# Patient Record
Sex: MALE | Race: WHITE | NOT HISPANIC OR LATINO | URBAN - METROPOLITAN AREA
[De-identification: names, ages, dates, MRNs, and addresses within clinical notes are randomized per-mention and may not be internally consistent; named-entity substitution may affect disease eponyms.]

---

## 2021-10-27 ENCOUNTER — INPATIENT (INPATIENT)
Facility: HOSPITAL | Age: 66
LOS: 4 days | Discharge: HOME HEALTH | End: 2021-11-01
Attending: SURGERY | Admitting: SURGERY
Payer: MEDICARE

## 2021-10-27 VITALS
RESPIRATION RATE: 20 BRPM | HEART RATE: 88 BPM | DIASTOLIC BLOOD PRESSURE: 79 MMHG | TEMPERATURE: 97 F | OXYGEN SATURATION: 99 % | SYSTOLIC BLOOD PRESSURE: 147 MMHG

## 2021-10-27 DIAGNOSIS — Z78.9 OTHER SPECIFIED HEALTH STATUS: Chronic | ICD-10-CM

## 2021-10-27 LAB
ANION GAP SERPL CALC-SCNC: 10 MMOL/L — SIGNIFICANT CHANGE UP (ref 7–14)
APTT BLD: 28.3 SEC — SIGNIFICANT CHANGE UP (ref 27–39.2)
BASOPHILS # BLD AUTO: 0.04 K/UL — SIGNIFICANT CHANGE UP (ref 0–0.2)
BASOPHILS NFR BLD AUTO: 0.4 % — SIGNIFICANT CHANGE UP (ref 0–1)
BLD GP AB SCN SERPL QL: SIGNIFICANT CHANGE UP
BUN SERPL-MCNC: 18 MG/DL — SIGNIFICANT CHANGE UP (ref 10–20)
CALCIUM SERPL-MCNC: 9.8 MG/DL — SIGNIFICANT CHANGE UP (ref 8.5–10.1)
CHLORIDE SERPL-SCNC: 103 MMOL/L — SIGNIFICANT CHANGE UP (ref 98–110)
CO2 SERPL-SCNC: 29 MMOL/L — SIGNIFICANT CHANGE UP (ref 17–32)
CREAT SERPL-MCNC: 1.2 MG/DL — SIGNIFICANT CHANGE UP (ref 0.7–1.5)
EOSINOPHIL # BLD AUTO: 0.04 K/UL — SIGNIFICANT CHANGE UP (ref 0–0.7)
EOSINOPHIL NFR BLD AUTO: 0.4 % — SIGNIFICANT CHANGE UP (ref 0–8)
GLUCOSE SERPL-MCNC: 127 MG/DL — HIGH (ref 70–99)
HCT VFR BLD CALC: 45.9 % — SIGNIFICANT CHANGE UP (ref 42–52)
HGB BLD-MCNC: 15 G/DL — SIGNIFICANT CHANGE UP (ref 14–18)
IMM GRANULOCYTES NFR BLD AUTO: 0.2 % — SIGNIFICANT CHANGE UP (ref 0.1–0.3)
INR BLD: 1.03 RATIO — SIGNIFICANT CHANGE UP (ref 0.65–1.3)
LYMPHOCYTES # BLD AUTO: 1.24 K/UL — SIGNIFICANT CHANGE UP (ref 1.2–3.4)
LYMPHOCYTES # BLD AUTO: 13.8 % — LOW (ref 20.5–51.1)
MCHC RBC-ENTMCNC: 31.3 PG — HIGH (ref 27–31)
MCHC RBC-ENTMCNC: 32.7 G/DL — SIGNIFICANT CHANGE UP (ref 32–37)
MCV RBC AUTO: 95.8 FL — HIGH (ref 80–94)
MONOCYTES # BLD AUTO: 0.55 K/UL — SIGNIFICANT CHANGE UP (ref 0.1–0.6)
MONOCYTES NFR BLD AUTO: 6.1 % — SIGNIFICANT CHANGE UP (ref 1.7–9.3)
NEUTROPHILS # BLD AUTO: 7.08 K/UL — HIGH (ref 1.4–6.5)
NEUTROPHILS NFR BLD AUTO: 79.1 % — HIGH (ref 42.2–75.2)
NRBC # BLD: 0 /100 WBCS — SIGNIFICANT CHANGE UP (ref 0–0)
PLATELET # BLD AUTO: 302 K/UL — SIGNIFICANT CHANGE UP (ref 130–400)
POTASSIUM SERPL-MCNC: 4.6 MMOL/L — SIGNIFICANT CHANGE UP (ref 3.5–5)
POTASSIUM SERPL-SCNC: 4.6 MMOL/L — SIGNIFICANT CHANGE UP (ref 3.5–5)
PROTHROM AB SERPL-ACNC: 11.8 SEC — SIGNIFICANT CHANGE UP (ref 9.95–12.87)
RBC # BLD: 4.79 M/UL — SIGNIFICANT CHANGE UP (ref 4.7–6.1)
RBC # FLD: 14.2 % — SIGNIFICANT CHANGE UP (ref 11.5–14.5)
SARS-COV-2 RNA SPEC QL NAA+PROBE: SIGNIFICANT CHANGE UP
SODIUM SERPL-SCNC: 142 MMOL/L — SIGNIFICANT CHANGE UP (ref 135–146)
WBC # BLD: 8.97 K/UL — SIGNIFICANT CHANGE UP (ref 4.8–10.8)
WBC # FLD AUTO: 8.97 K/UL — SIGNIFICANT CHANGE UP (ref 4.8–10.8)

## 2021-10-27 PROCEDURE — 73552 X-RAY EXAM OF FEMUR 2/>: CPT | Mod: 26,LT

## 2021-10-27 PROCEDURE — 93010 ELECTROCARDIOGRAM REPORT: CPT

## 2021-10-27 PROCEDURE — 99222 1ST HOSP IP/OBS MODERATE 55: CPT

## 2021-10-27 PROCEDURE — 99285 EMERGENCY DEPT VISIT HI MDM: CPT

## 2021-10-27 RX ORDER — TETANUS TOXOID, REDUCED DIPHTHERIA TOXOID AND ACELLULAR PERTUSSIS VACCINE, ADSORBED 5; 2.5; 8; 8; 2.5 [IU]/.5ML; [IU]/.5ML; UG/.5ML; UG/.5ML; UG/.5ML
0.5 SUSPENSION INTRAMUSCULAR ONCE
Refills: 0 | Status: COMPLETED | OUTPATIENT
Start: 2021-10-27 | End: 2021-10-27

## 2021-10-27 RX ORDER — PANTOPRAZOLE SODIUM 20 MG/1
40 TABLET, DELAYED RELEASE ORAL
Refills: 0 | Status: DISCONTINUED | OUTPATIENT
Start: 2021-10-27 | End: 2021-11-01

## 2021-10-27 RX ORDER — ATORVASTATIN CALCIUM 80 MG/1
40 TABLET, FILM COATED ORAL AT BEDTIME
Refills: 0 | Status: DISCONTINUED | OUTPATIENT
Start: 2021-10-27 | End: 2021-11-01

## 2021-10-27 RX ORDER — LISINOPRIL 2.5 MG/1
10 TABLET ORAL DAILY
Refills: 0 | Status: DISCONTINUED | OUTPATIENT
Start: 2021-10-27 | End: 2021-11-01

## 2021-10-27 RX ORDER — HYDROMORPHONE HYDROCHLORIDE 2 MG/ML
0.5 INJECTION INTRAMUSCULAR; INTRAVENOUS; SUBCUTANEOUS EVERY 4 HOURS
Refills: 0 | Status: DISCONTINUED | OUTPATIENT
Start: 2021-10-27 | End: 2021-10-28

## 2021-10-27 RX ORDER — ALBUTEROL 90 UG/1
2 AEROSOL, METERED ORAL EVERY 6 HOURS
Refills: 0 | Status: DISCONTINUED | OUTPATIENT
Start: 2021-10-27 | End: 2021-11-01

## 2021-10-27 RX ORDER — ASPIRIN/CALCIUM CARB/MAGNESIUM 324 MG
81 TABLET ORAL DAILY
Refills: 0 | Status: DISCONTINUED | OUTPATIENT
Start: 2021-10-29 | End: 2021-11-01

## 2021-10-27 RX ORDER — ONDANSETRON 8 MG/1
4 TABLET, FILM COATED ORAL ONCE
Refills: 0 | Status: COMPLETED | OUTPATIENT
Start: 2021-10-27 | End: 2021-10-27

## 2021-10-27 RX ORDER — CEFAZOLIN SODIUM 1 G
1000 VIAL (EA) INJECTION ONCE
Refills: 0 | Status: COMPLETED | OUTPATIENT
Start: 2021-10-27 | End: 2021-10-27

## 2021-10-27 RX ORDER — CEFAZOLIN SODIUM 1 G
1000 VIAL (EA) INJECTION EVERY 8 HOURS
Refills: 0 | Status: DISCONTINUED | OUTPATIENT
Start: 2021-10-27 | End: 2021-11-01

## 2021-10-27 RX ORDER — HYDROMORPHONE HYDROCHLORIDE 2 MG/ML
0.5 INJECTION INTRAMUSCULAR; INTRAVENOUS; SUBCUTANEOUS ONCE
Refills: 0 | Status: DISCONTINUED | OUTPATIENT
Start: 2021-10-27 | End: 2021-10-27

## 2021-10-27 RX ADMIN — ATORVASTATIN CALCIUM 40 MILLIGRAM(S): 80 TABLET, FILM COATED ORAL at 21:58

## 2021-10-27 RX ADMIN — ONDANSETRON 4 MILLIGRAM(S): 8 TABLET, FILM COATED ORAL at 23:17

## 2021-10-27 RX ADMIN — HYDROMORPHONE HYDROCHLORIDE 0.5 MILLIGRAM(S): 2 INJECTION INTRAMUSCULAR; INTRAVENOUS; SUBCUTANEOUS at 21:58

## 2021-10-27 RX ADMIN — HYDROMORPHONE HYDROCHLORIDE 0.5 MILLIGRAM(S): 2 INJECTION INTRAMUSCULAR; INTRAVENOUS; SUBCUTANEOUS at 17:07

## 2021-10-27 RX ADMIN — HYDROMORPHONE HYDROCHLORIDE 0.5 MILLIGRAM(S): 2 INJECTION INTRAMUSCULAR; INTRAVENOUS; SUBCUTANEOUS at 22:30

## 2021-10-27 RX ADMIN — TETANUS TOXOID, REDUCED DIPHTHERIA TOXOID AND ACELLULAR PERTUSSIS VACCINE, ADSORBED 0.5 MILLILITER(S): 5; 2.5; 8; 8; 2.5 SUSPENSION INTRAMUSCULAR at 15:28

## 2021-10-27 RX ADMIN — Medication 100 MILLIGRAM(S): at 15:28

## 2021-10-27 RX ADMIN — Medication 100 MILLIGRAM(S): at 22:22

## 2021-10-27 RX ADMIN — HYDROMORPHONE HYDROCHLORIDE 0.5 MILLIGRAM(S): 2 INJECTION INTRAMUSCULAR; INTRAVENOUS; SUBCUTANEOUS at 17:06

## 2021-10-27 NOTE — PATIENT PROFILE ADULT - NSPROPTRIGHTBILLOFRIGHTS_GEN_A_NUR
Pt called ? If we received doppler results done in [place] last week  I do see copy scanned into chart, will req TATYANA Murray to review and advise, pt would like return call re: results  patient

## 2021-10-27 NOTE — ED PROVIDER NOTE - PHYSICAL EXAMINATION
VITAL SIGNS: I have reviewed nursing notes and confirm.  CONSTITUTIONAL: Well-developed; well-nourished; in no acute distress.   SKIN: large/deep 4 inch lac to left thigh down to muscle  HEAD: Normocephalic; atraumatic.  EYES:  conjunctiva and sclera clear.  ENT: No nasal discharge; airway clear.  CARD: S1, S2 normal; no murmurs, gallops, or rubs. Regular rate and rhythm.   RESP: No wheezes, rales or rhonchi.  ABD: Normal bowel sounds; soft; non-distended; non-tender  EXT: Normal ROM.  No clubbing, cyanosis or edema.   LYMPH: No acute cervical adenopathy.  NEURO: Alert, oriented, grossly unremarkable

## 2021-10-27 NOTE — H&P ADULT - NSHPLABSRESULTS_GEN_ALL_CORE
15.0   8.97  )-----------( 302      ( 27 Oct 2021 15:58 )             45.9     10-27    142  |  103  |  18  ----------------------------<  127<H>  4.6   |  29  |  1.2    Ca    9.8      27 Oct 2021 15:58              PT/INR - ( 27 Oct 2021 15:58 )   PT: 11.80 sec;   INR: 1.03 ratio         PTT - ( 27 Oct 2021 15:58 )  PTT:28.3 sec  Lactate Trend        CAPILLARY BLOOD GLUCOSE

## 2021-10-27 NOTE — ED ADULT NURSE NOTE - TEMPLATE LIST FOR HEAD TO TOE ASSESSMENT
Spoke to patient and she stated that she will make an appointment online for the CPE which is due this month. Rx filled for one month per Dr. Edouard, note made to pharmacy for the reason of refill.   Wounds

## 2021-10-27 NOTE — H&P ADULT - HISTORY OF PRESENT ILLNESS
67yo male (seems forgetful, obtained several details including medication list and PMH from surgical team) whose PMH includes CVA, HTN and HLD, presents to the ER with left thigh laceration following an accident with a . Denies fevers, chills and currently no pain Dilaudid was ordered for him in the ER). In the ER he did receive IV antibiotics and general surgery preformed a skin closure procedure. Because of muscle (and likely nerve) involvement, orthopedist surgery was called

## 2021-10-27 NOTE — ED PROVIDER NOTE - ATTENDING CONTRIBUTION TO CARE
65 yo M pmh of CVA, HTN, HLD presents with laceration to the left upper thigh. Was doing work at a friends house when the  got caught and went into his thigh. Large laceration to the upper thigh. Bleeding stopped prior to arrival. Unknown when last tdap was. Currently on plavix. Walking with a limp due to pain.     CONSTITUTIONAL: Well-developed; well-nourished; in no acute distress.   SKIN: warm, dry. 12 cm laceration to the left upper thigh, + muscle involvement. + debris in the wound.   HEAD: Normocephalic; atraumatic.  EXT: Normal ROM.  5/5 strength in all 4 extremities   LYMPH: No acute cervical adenopathy.  NEURO: decreased sensation to the medial and just inferior to the wound. motor strength intact.

## 2021-10-27 NOTE — PROCEDURE NOTE - ADDITIONAL PROCEDURE DETAILS
This patient is to be evaluated further tomorrow by orthopedics because the wound involved muscle. Motor intact. Decreased sensation on skin inferior to wound. The laceration is deep and appears to involve the vastus medialis. Irrigated copiously with saline and betadine. Closed with 4 vertical mattress and 5 simple sutures (alternating). This is a temporary closure.

## 2021-10-27 NOTE — CONSULT NOTE ADULT - SUBJECTIVE AND OBJECTIVE BOX
66 yr oldM w/PMHX: HTN, HLD, ?emphysema hx CVA 2 yrs ago on Plavix, now presents to the ER with left thigh wound after accidentally cutting it with a chop saw approx 1 hr PTA. He was able to stop the bleeding while at home, and it is not actively bleeding now.  He is able to bend/flex at the knee and ankle; only c/o some numbness inferior to wound.        PAST MEDICAL & SURGICAL HISTORY:  HTN  HLD  ?emphysema (ex-smoker)  CVA 2 yrs ago  denies surgeries    Home Medications:  Plavix 75 mg daily (last dose 10/26 8 pm)    Allergies  No Known Allergies    Social HX:  ex-smoker quit 3yrs ago  ex-ETOh quit a couple of years ago  denies illicit drugs    [place]    REVIEW OF SYSTEMS    [x ] A ten-point review of systems was otherwise negative except as noted.  [ ] Due to altered mental status/intubation, subjective information were not able to be obtained from the patient. History was obtained, to the extent possible, from review of the chart and collateral sources of information.      Vital Signs Last 24 Hrs  T(C): 36.2 (27 Oct 2021 14:54), Max: 36.2 (27 Oct 2021 14:54)  T(F): 97.2 (27 Oct 2021 14:54), Max: 97.2 (27 Oct 2021 14:54)  HR: 88 (27 Oct 2021 14:54) (88 - 88)  BP: 147/79 (27 Oct 2021 14:54) (147/79 - 147/79)  BP(mean): --  RR: 20 (27 Oct 2021 14:54) (20 - 20)  SpO2: 99% (27 Oct 2021 14:54) (99% - 99%)    PHYSICAL EXAM:  GENERAL: NAD, well-appearing  EXTREMITIES:  Left anterior thigh incision approx 12cm- not actively bleeding, appears to extend to muscle.  +decreased sensation just inferior to wound +sensation and ROM intact at knee/ankle      LABS:  Labs:  CAPILLARY BLOOD GLUCOSE                      LFTs:         Coags:                    RADIOLOGY & ADDITIONAL STUDIES:   66 yr oldM w/PMHX: HTN, HLD, ?emphysema hx CVA 2 yrs ago on Plavix/ASA, now presents to the ER with left anterior left thigh wound after accidentally cutting it with a chop saw approx 1 hr PTA. He was able to stop the bleeding while at home, and it is not actively bleeding now.  He is able to bend/flex at the knee and ankle; only c/o some numbness just inferior to wound.  He lives in [place], [place] - only here doing work. Pt does not remember the names of any of his medications      PAST MEDICAL & SURGICAL HISTORY:  HTN  HLD  ?emphysema (ex-smoker)  CVA 2 yrs ago  denies surgeries    Home Medications:  (AS PER OUTPATIENT MED REC)  Plavix 75 mg daily (last dose 10/26 8 pm)    Allergies  No Known Allergies    Social HX:  ex-smoker quit 3yrs ago  ex-ETOh quit a couple of years ago  denies illicit drugs    [place]    REVIEW OF SYSTEMS    [x ] A ten-point review of systems was otherwise negative except as noted.  [ ] Due to altered mental status/intubation, subjective information were not able to be obtained from the patient. History was obtained, to the extent possible, from review of the chart and collateral sources of information.      Vital Signs Last 24 Hrs  T(C): 36.2 (27 Oct 2021 14:54), Max: 36.2 (27 Oct 2021 14:54)  T(F): 97.2 (27 Oct 2021 14:54), Max: 97.2 (27 Oct 2021 14:54)  HR: 88 (27 Oct 2021 14:54) (88 - 88)  BP: 147/79 (27 Oct 2021 14:54) (147/79 - 147/79)  BP(mean): --  RR: 20 (27 Oct 2021 14:54) (20 - 20)  SpO2: 99% (27 Oct 2021 14:54) (99% - 99%)    PHYSICAL EXAM:  GENERAL: NAD, well-appearing  EXTREMITIES:  Left anterior thigh incision approx 12cm- not actively bleeding, appears to extend to muscle.  +decreased sensation just inferior to wound +sensation and ROM intact at knee/ankle    Labs:   Pending    Radiology:  Left femur xray: pending     66 yr oldM w/PMHX: HTN, HLD, ?emphysema hx CVA 2 yrs ago on Plavix/ASA, now presents to the ER with left anterior left thigh wound after accidentally cutting it with a chop saw approx 1 hr PTA. He was able to stop the bleeding while at home, and it is not actively bleeding now.  He is able to bend/flex at the knee and ankle; only c/o some numbness just inferior to wound.  He lives in [place], [place] - only here doing work. Pt does not remember the names of any of his medications    In ER, patient received Ancef, tetanus shot    PAST MEDICAL & SURGICAL HISTORY:  HTN  HLD  ?emphysema (ex-smoker)  CVA 2 yrs ago  denies surgeries    Home Medications:  (AS PER OUTPATIENT MED REC; PT DOES NOT REMEMBER NAMES OF MEDICATIONS)  Plavix 75 mg daily (last dose 10/26 8 pm)  ASA 81 mg daily  lisinopril 10 mg daily  pantoprazole 40 mg daily  atorvastatin 40 mg daily  MVI daily  Albuterol MDI    Allergies  No Known Allergies    Social HX:  ex-smoker quit 3yrs ago  ex-ETOh quit a couple of years ago  denies illicit drugs  [facility]    REVIEW OF SYSTEMS    [x ] A ten-point review of systems was otherwise negative except as noted.  [ ] Due to altered mental status/intubation, subjective information were not able to be obtained from the patient. History was obtained, to the extent possible, from review of the chart and collateral sources of information.      Vital Signs Last 24 Hrs  T(C): 36.2 (27 Oct 2021 14:54), Max: 36.2 (27 Oct 2021 14:54)  T(F): 97.2 (27 Oct 2021 14:54), Max: 97.2 (27 Oct 2021 14:54)  HR: 88 (27 Oct 2021 14:54) (88 - 88)  BP: 147/79 (27 Oct 2021 14:54) (147/79 - 147/79)  BP(mean): --  RR: 20 (27 Oct 2021 14:54) (20 - 20)  SpO2: 99% (27 Oct 2021 14:54) (99% - 99%)    PHYSICAL EXAM:  GENERAL: NAD, well-appearing  EXTREMITIES:  Left anterior thigh incision approx 12cm- not actively bleeding, appears to extend to muscle.  +decreased sensation just inferior to wound +sensation and ROM intact at knee/ankle    Labs:   Pending    Radiology:  Left femur xray: pending     66 yr oldM w/PMHX: HTN, HLD, ?emphysema hx CVA 2 yrs ago on Plavix/ASA, now presents to the ER with left anterior left thigh wound after accidentally cutting it with a  approx 1 hr PTA. He was able to stop the bleeding while at home, and it is not actively bleeding now.  He is able to bend/flex at the knee and ankle; only c/o some numbness just inferior to wound.  He lives in [place], [place] - only here doing work. Pt does not remember the names of any of his medications    In ER, patient received Ancef, tetanus shot    PAST MEDICAL & SURGICAL HISTORY:  HTN  HLD  ?emphysema (ex-smoker)  CVA 2 yrs ago  denies surgeries    Home Medications:  (AS PER OUTPATIENT MED REC; PT DOES NOT REMEMBER NAMES OF MEDICATIONS)  Plavix 75 mg daily (last dose 10/26 8 pm)  ASA 81 mg daily  lisinopril 10 mg daily  pantoprazole 40 mg daily  atorvastatin 40 mg daily  MVI daily  Albuterol MDI    Allergies  No Known Allergies    Social HX:  ex-smoker quit 3yrs ago  ex-ETOh quit a couple of years ago  denies illicit drugs  [facility]    REVIEW OF SYSTEMS    [x ] A ten-point review of systems was otherwise negative except as noted.  [ ] Due to altered mental status/intubation, subjective information were not able to be obtained from the patient. History was obtained, to the extent possible, from review of the chart and collateral sources of information.      Vital Signs Last 24 Hrs  T(C): 36.2 (27 Oct 2021 14:54), Max: 36.2 (27 Oct 2021 14:54)  T(F): 97.2 (27 Oct 2021 14:54), Max: 97.2 (27 Oct 2021 14:54)  HR: 88 (27 Oct 2021 14:54) (88 - 88)  BP: 147/79 (27 Oct 2021 14:54) (147/79 - 147/79)  BP(mean): --  RR: 20 (27 Oct 2021 14:54) (20 - 20)  SpO2: 99% (27 Oct 2021 14:54) (99% - 99%)    PHYSICAL EXAM:  GENERAL: NAD, well-appearing  EXTREMITIES:  Left anterior thigh incision approx 12cm- not actively bleeding, appears to extend to muscle.  +decreased sensation just inferior to wound +sensation and ROM intact at knee/ankle    Labs:   Pending    Radiology:  Left femur xray: pending

## 2021-10-27 NOTE — ED ADULT TRIAGE NOTE - CHIEF COMPLAINT QUOTE
BIBA as per EMS pt got cut on his left upper thigh. Pt is on a blood thinner unknown to what medication

## 2021-10-27 NOTE — CONSULT NOTE ADULT - ASSESSMENT
66 yr oldM w/PMHX: HTN, HLD, ?emphysema hx CVA 2 yrs ago on Plavix/ASA, now presents to the ER with left anterior left thigh wound after accidentally cutting it with a chop saw     1. Left thigh laceration 66 yr oldM w/PMHX: HTN, HLD, ?emphysema hx CVA 2 yrs ago on Plavix/ASA, now presents to the ER with left anterior left thigh wound after accidentally cutting it with a chop saw.    Discussed case with Dr. Vaca, who suggested we consult orthopedics for evaluation of muscle injury. Of note, patient was able to walk into the ED, and does not feel like he has lost any motor function. Patient is able to move LLE without difficulty. Only findings is decreased sensation just inferior to wound. Patient has no active extravasation, and bleeding is controlled.     Wound explored after copious irrigation with saline and betadine. Did remove some foreign material (patient reports was part of /saw.) Approximated skin with 3-0 Nylon using vertical mattress and simple sutures. Hemostasis achieved. Patient hemodynamically stable.     Admit.  IV Antibiotics  NPO at midnight  Orthopedics to see patient tomorrow. (Spoke directly with Dr. Heidi Bauer)  Further management to follow.   Holding Plavix  Heparin SubQ and protonix for prophylaxis.   1. Left thigh laceration

## 2021-10-27 NOTE — ED PROVIDER NOTE - CLINICAL SUMMARY MEDICAL DECISION MAKING FREE TEXT BOX
Patient presents with large laceration to the left upper thigh. labs, xray, ekg done. Seen by surgery who temporarily closed wound bedside, plan for OR for washout and repair. ancef given. Patient admitted for further management.

## 2021-10-27 NOTE — H&P ADULT - NSHPREVIEWOFSYSTEMS_GEN_ALL_CORE
At least 9 items from ROS discussed with patient and are generally negative except for pertinent positives in HPI and PMH

## 2021-10-27 NOTE — ED PROVIDER NOTE - OBJECTIVE STATEMENT
Pt is a 67y/o male with a pmhx of CVA on plavix, HTN, HLD presents today for eval of lac to left thigh from . Pt admits to associated numbness around wound. Pt denies fever, chills, weakness, CP, SOB.

## 2021-10-27 NOTE — ED PROVIDER NOTE - PROGRESS NOTE DETAILS
wound assess bty gen surg, closed skin, Dr. Heidi Bauer of Ortho notified and will see pt as inpatient tomorrow with plan for OR procedure

## 2021-10-27 NOTE — H&P ADULT - ASSESSMENT
left leg laceration left leg laceration- for now continue IV antibiotics, await orthopedist review (neurology consult for nerve involvement?) left leg laceration- for now continue IV antibiotics, await orthopedist review (neurology consult for nerve involvement?)    HTN-monitor     HLD- on statin    history of CVA- will delay antiplatelets restart depending on surgical plans left leg laceration- for now continue IV antibiotics, await orthopedist review (neurology consult for nerve involvement?)    HTN-monitor on lisinopril    HLD- on statin    history of CVA (2 years ago?)- will delay antiplatelets restart depending on surgical plans    Possible emphysema

## 2021-10-27 NOTE — CONSULT NOTE ADULT - ATTENDING COMMENTS
above noted discussed case with surgical resident management of laceration discussed case with surgical resident

## 2021-10-27 NOTE — ED PROVIDER NOTE - NS ED ROS FT
Eyes:  No visual changes, eye pain or discharge.  ENMT:  No hearing changes, pain, discharge or infections. No neck pain or stiffness.  Cardiac:  No chest pain, SOB   Respiratory:  No cough or respiratory distress. No hemoptysis. No history of asthma or RAD.  GI:  No nausea, vomiting, diarrhea or abdominal pain.  :  No dysuria, frequency or burning.  MS:  No myalgia, muscle weakness, joint pain or back pain.  Neuro:  No headache or weakness.  No LOC.  Skin: + lac  Endocrine: No history of thyroid disease or diabetes.  Except as documented in the HPI,  all other systems are negative.

## 2021-10-28 LAB
ALBUMIN SERPL ELPH-MCNC: 4.4 G/DL — SIGNIFICANT CHANGE UP (ref 3.5–5.2)
ALP SERPL-CCNC: 93 U/L — SIGNIFICANT CHANGE UP (ref 30–115)
ALT FLD-CCNC: 17 U/L — SIGNIFICANT CHANGE UP (ref 0–41)
ANION GAP SERPL CALC-SCNC: 9 MMOL/L — SIGNIFICANT CHANGE UP (ref 7–14)
AST SERPL-CCNC: 14 U/L — SIGNIFICANT CHANGE UP (ref 0–41)
BILIRUB SERPL-MCNC: 0.4 MG/DL — SIGNIFICANT CHANGE UP (ref 0.2–1.2)
BUN SERPL-MCNC: 16 MG/DL — SIGNIFICANT CHANGE UP (ref 10–20)
CALCIUM SERPL-MCNC: 9.7 MG/DL — SIGNIFICANT CHANGE UP (ref 8.5–10.1)
CHLORIDE SERPL-SCNC: 100 MMOL/L — SIGNIFICANT CHANGE UP (ref 98–110)
CO2 SERPL-SCNC: 30 MMOL/L — SIGNIFICANT CHANGE UP (ref 17–32)
CREAT SERPL-MCNC: 0.9 MG/DL — SIGNIFICANT CHANGE UP (ref 0.7–1.5)
GLUCOSE SERPL-MCNC: 114 MG/DL — HIGH (ref 70–99)
HCT VFR BLD CALC: 42.1 % — SIGNIFICANT CHANGE UP (ref 42–52)
HCV AB S/CO SERPL IA: 0.03 COI — SIGNIFICANT CHANGE UP
HCV AB SERPL-IMP: SIGNIFICANT CHANGE UP
HGB BLD-MCNC: 13.8 G/DL — LOW (ref 14–18)
MCHC RBC-ENTMCNC: 31.6 PG — HIGH (ref 27–31)
MCHC RBC-ENTMCNC: 32.8 G/DL — SIGNIFICANT CHANGE UP (ref 32–37)
MCV RBC AUTO: 96.3 FL — HIGH (ref 80–94)
NRBC # BLD: 0 /100 WBCS — SIGNIFICANT CHANGE UP (ref 0–0)
PLATELET # BLD AUTO: 227 K/UL — SIGNIFICANT CHANGE UP (ref 130–400)
POTASSIUM SERPL-MCNC: 4.8 MMOL/L — SIGNIFICANT CHANGE UP (ref 3.5–5)
POTASSIUM SERPL-SCNC: 4.8 MMOL/L — SIGNIFICANT CHANGE UP (ref 3.5–5)
PROT SERPL-MCNC: 6.6 G/DL — SIGNIFICANT CHANGE UP (ref 6–8)
RBC # BLD: 4.37 M/UL — LOW (ref 4.7–6.1)
RBC # FLD: 14.5 % — SIGNIFICANT CHANGE UP (ref 11.5–14.5)
SODIUM SERPL-SCNC: 139 MMOL/L — SIGNIFICANT CHANGE UP (ref 135–146)
WBC # BLD: 8.49 K/UL — SIGNIFICANT CHANGE UP (ref 4.8–10.8)
WBC # FLD AUTO: 8.49 K/UL — SIGNIFICANT CHANGE UP (ref 4.8–10.8)

## 2021-10-28 PROCEDURE — 99231 SBSQ HOSP IP/OBS SF/LOW 25: CPT

## 2021-10-28 RX ORDER — OXYCODONE AND ACETAMINOPHEN 5; 325 MG/1; MG/1
1 TABLET ORAL EVERY 4 HOURS
Refills: 0 | Status: DISCONTINUED | OUTPATIENT
Start: 2021-10-28 | End: 2021-10-29

## 2021-10-28 RX ORDER — ONDANSETRON 8 MG/1
4 TABLET, FILM COATED ORAL
Refills: 0 | Status: DISCONTINUED | OUTPATIENT
Start: 2021-10-28 | End: 2021-11-01

## 2021-10-28 RX ORDER — LANOLIN ALCOHOL/MO/W.PET/CERES
3 CREAM (GRAM) TOPICAL ONCE
Refills: 0 | Status: COMPLETED | OUTPATIENT
Start: 2021-10-28 | End: 2021-10-28

## 2021-10-28 RX ORDER — HEPARIN SODIUM 5000 [USP'U]/ML
5000 INJECTION INTRAVENOUS; SUBCUTANEOUS EVERY 8 HOURS
Refills: 0 | Status: DISCONTINUED | OUTPATIENT
Start: 2021-10-28 | End: 2021-11-01

## 2021-10-28 RX ADMIN — Medication 100 MILLIGRAM(S): at 13:39

## 2021-10-28 RX ADMIN — ATORVASTATIN CALCIUM 40 MILLIGRAM(S): 80 TABLET, FILM COATED ORAL at 21:50

## 2021-10-28 RX ADMIN — PANTOPRAZOLE SODIUM 40 MILLIGRAM(S): 20 TABLET, DELAYED RELEASE ORAL at 06:02

## 2021-10-28 RX ADMIN — LISINOPRIL 10 MILLIGRAM(S): 2.5 TABLET ORAL at 06:02

## 2021-10-28 RX ADMIN — HYDROMORPHONE HYDROCHLORIDE 0.5 MILLIGRAM(S): 2 INJECTION INTRAMUSCULAR; INTRAVENOUS; SUBCUTANEOUS at 11:21

## 2021-10-28 RX ADMIN — HYDROMORPHONE HYDROCHLORIDE 0.5 MILLIGRAM(S): 2 INJECTION INTRAMUSCULAR; INTRAVENOUS; SUBCUTANEOUS at 06:32

## 2021-10-28 RX ADMIN — HYDROMORPHONE HYDROCHLORIDE 0.5 MILLIGRAM(S): 2 INJECTION INTRAMUSCULAR; INTRAVENOUS; SUBCUTANEOUS at 06:01

## 2021-10-28 RX ADMIN — HYDROMORPHONE HYDROCHLORIDE 0.5 MILLIGRAM(S): 2 INJECTION INTRAMUSCULAR; INTRAVENOUS; SUBCUTANEOUS at 10:48

## 2021-10-28 RX ADMIN — HEPARIN SODIUM 5000 UNIT(S): 5000 INJECTION INTRAVENOUS; SUBCUTANEOUS at 21:50

## 2021-10-28 RX ADMIN — Medication 100 MILLIGRAM(S): at 21:49

## 2021-10-28 RX ADMIN — HEPARIN SODIUM 5000 UNIT(S): 5000 INJECTION INTRAVENOUS; SUBCUTANEOUS at 11:16

## 2021-10-28 RX ADMIN — Medication 3 MILLIGRAM(S): at 22:00

## 2021-10-28 RX ADMIN — ONDANSETRON 4 MILLIGRAM(S): 8 TABLET, FILM COATED ORAL at 11:55

## 2021-10-28 RX ADMIN — Medication 100 MILLIGRAM(S): at 06:02

## 2021-10-28 RX ADMIN — Medication 1 TABLET(S): at 11:18

## 2021-10-28 RX ADMIN — OXYCODONE AND ACETAMINOPHEN 1 TABLET(S): 5; 325 TABLET ORAL at 16:10

## 2021-10-28 RX ADMIN — OXYCODONE AND ACETAMINOPHEN 1 TABLET(S): 5; 325 TABLET ORAL at 16:40

## 2021-10-28 NOTE — PROGRESS NOTE ADULT - ASSESSMENT
#laceration to left thigh - sp suture closure by surgery Dr Vaca  on abx ppx - can change to keflex for 3 days on dc  surgery spoke with Ortho Dr itzel farmer to eval pt prior to dc  ambulate pt after then dc home today #laceration to left thigh - sp suture closure by surgery Dr Vaca  on abx ppx - can change to keflex for 3 days on dc  surgery spoke with Ortho Dr itzel farmer to eval pt around 5 pm  ambulate pt - needs rolling walker  spoke with surgical PA [id number] - awaiting on Dr Vaca - may need to go to OR for further wash out? no clear plan - awaiting surgery to call back   pt has his own car here  will hold plavix in case of OR

## 2021-10-28 NOTE — CONSULT NOTE ADULT - ASSESSMENT
Patient is a 66y old  Male whose PMH includes CVA, HTN and HLD, presents to the ER for evaluation of left thigh laceration following an accident with a . No fever, or chills.  ON admission, he found to have no fever and No Leukocytosis. He did received IV antibiotics and general surgery preformed a skin closure procedure. Because of muscle (and likely nerve) involvement, orthopedist surgery was called. He started on Cefazolin, and the ID consult requested to assist with further evaluation and antibiotic management.     # Left thigh wound infection    would recommend:    1. Please obtain wound culture  2.     will follow the patient with you and make further recommendation based on the clinical course and Lab results  Thank you for the opportunity to participate in Mr. ANDREWS's care      Attending Attestation:    Spent more than 65 minutes on total encounter, more than 50 % of the visit was spent counseling and/or coordinating care by the Attending physician.         Patient is a 66y old  Male whose PMH includes CVA, HTN and HLD, presents to the ER for evaluation of left thigh laceration following an accident with a . No fever, or chills.  ON admission, he found to have no fever and No Leukocytosis. He did received IV antibiotics and general surgery preformed a skin closure procedure. Because of muscle (and likely nerve) involvement, orthopedist surgery was called. He started on Cefazolin, and the ID consult requested to assist with further evaluation and antibiotic management.     # Left medial thigh  laceration wound infection- s/p wound irrigation and closed in ER, no culture is in the system    would recommend:    1. Continue Cefazolin for now  2. May change to oral on discharge when improved  3. Pain management as needed    will follow the patient with you and make further recommendation based on the clinical course and Lab results  Thank you for the opportunity to participate in Mr. ANDREWS's care      Attending Attestation:    Spent more than 65 minutes on total encounter, more than 50 % of the visit was spent counseling and/or coordinating care by the Attending physician.

## 2021-10-28 NOTE — PHYSICAL THERAPY INITIAL EVALUATION ADULT - GENERAL OBSERVATIONS, REHAB EVAL
14:45-15:05 Chart reviewed. Patient available to be seen for physical therapy, denies pain, confirmed with RN. Pt rec'd in bed +L thigh Ace wrapped in NAD.

## 2021-10-28 NOTE — CONSULT NOTE ADULT - SUBJECTIVE AND OBJECTIVE BOX
patient admitted after laceration to left medial thigh with  yesterday  wound was irrigated and closed in ER  currently on ABX  notes some pain in the area    Vital Signs Last 24 Hrs  T(C): 36.7 (28 Oct 2021 13:51), Max: 36.7 (27 Oct 2021 20:05)  T(F): 98 (28 Oct 2021 13:51), Max: 98.1 (27 Oct 2021 20:05)  HR: 72 (28 Oct 2021 13:51) (68 - 79)  BP: 126/71 (28 Oct 2021 13:51) (126/71 - 168/90)  BP(mean): --  RR: 16 (28 Oct 2021 13:51) (16 - 20)  SpO2: 98% (27 Oct 2021 20:05) (98% - 98%)    exam shows a transverse wound over the anterior-medial aspect of the thigh  he is able to straight leg raise and adduct the leg  distal to laceration there is an area of decreased sensation, over the lateral aspect of the thigh and lower leg nvi  small amount of erythema and swelling around the skin edges but does not appear infected  remained of leg atraumatic  no swelling in lower leg, negative homans    x-ray shows a prior healed femur fracture, no retained foreign bodies noted    impression is laceration to the left thigh    at this point no significant motor defects, sensory largely intact would observe   continue antibiotics while in house and would discharge on a course of PO  no orthopedic intervention at this point

## 2021-10-28 NOTE — CONSULT NOTE ADULT - SUBJECTIVE AND OBJECTIVE BOX
Patient is a 66y old  Male whose PMH includes CVA, HTN and HLD, presents to the ER for evaluation of left thigh laceration following an accident with a . No fever, or chills.  ON admission, he found to have no fever and No Leukocytosis. He did received IV antibiotics and general surgery preformed a skin closure procedure. Because of muscle (and likely nerve) involvement, orthopedist surgery was called. He started on Cefazolin, and the ID consult requested to assist with further evaluation and antibiotic management.       REVIEW OF SYSTEMS: Total of twelve systems have been reviewed and found to be negative unless mentioned in HPI      PAST MEDICAL & SURGICAL HISTORY:  Medical history unknown  Surgical history unknown      SOCIAL HISTORY  Alcohol: Does not drink  Tobacco: Does not smoke  Illicit substance use: None      FAMILY HISTORY: Non contributory to the present illness      ALLERGIES: No Known Allergies      Vital Signs Last 24 Hrs  T(C): 36.7 (28 Oct 2021 13:51), Max: 36.7 (27 Oct 2021 20:05)  T(F): 98 (28 Oct 2021 13:51), Max: 98.1 (27 Oct 2021 20:05)  HR: 72 (28 Oct 2021 13:51) (68 - 79)  BP: 126/71 (28 Oct 2021 13:51) (126/71 - 168/90)  BP(mean): --  RR: 16 (28 Oct 2021 13:51) (16 - 20)  SpO2: 98% (27 Oct 2021 20:05) (98% - 98%)      PHYSICAL EXAM:  GENERAL: Not in distress   CHEST/LUNG: Not using accessory muscles   HEART: s1 and s2 present  ABDOMEN:  Nontender and  Nondistended  EXTREMITIES: No pedal  edema  CNS: Awake and Alert      LABS:                        13.8   8.49  )-----------( 227      ( 28 Oct 2021 06:46 )             42.1       10-28    139  |  100  |  16  ----------------------------<  114<H>  4.8   |  30  |  0.9    Ca    9.7      28 Oct 2021 06:46    TPro  6.6  /  Alb  4.4  /  TBili  0.4  /  DBili  x   /  AST  14  /  ALT  17  /  AlkPhos  93  10-28    PT/INR - ( 27 Oct 2021 15:58 )   PT: 11.80 sec;   INR: 1.03 ratio      PTT - ( 27 Oct 2021 15:58 )  PTT:28.3 sec        MEDICATIONS  (STANDING):  atorvastatin 40 milliGRAM(s) Oral at bedtime  ceFAZolin   IVPB 1000 milliGRAM(s) IV Intermittent every 8 hours  heparin   Injectable 5000 Unit(s) SubCutaneous every 8 hours  lisinopril 10 milliGRAM(s) Oral daily  multivitamin 1 Tablet(s) Oral daily  pantoprazole    Tablet 40 milliGRAM(s) Oral before breakfast    MEDICATIONS  (PRN):  ALBUTerol    90 MICROgram(s) HFA Inhaler 2 Puff(s) Inhalation every 6 hours PRN Shortness of Breath and/or Wheezing  ondansetron Injectable 4 milliGRAM(s) IV Push four times a day PRN Nausea and/or Vomiting  oxycodone    5 mG/acetaminophen 325 mG 1 Tablet(s) Oral every 4 hours PRN Moderate Pain (4 - 6)        RADIOLOGY & ADDITIONAL TESTS:    < from: Xray Femur 2 Views, Left (10.27.21 @ 16:38) >  Possible soft tissue laceration involving the medial left thigh at the level of the mid femur. Correlation with physical examination is recommended.    No acute displaced fracture dislocation.    Chronic healed left mid femur fracture with postsurgical change.    Vascular calcifications.    Degenerative change        MICROBIOLOGY DATA:    COVID-19 PCR (10.27.21 @ 15:13)   COVID-19 PCR: NotDetec:              Patient is a 66y old  Male whose PMH includes CVA, HTN and HLD, presents to the ER for evaluation of left thigh laceration following an accident with a . No fever, or chills.  ON admission, he found to have no fever and No Leukocytosis. He did received IV antibiotics and general surgery preformed a skin closure procedure. Because of muscle (and likely nerve) involvement, orthopedist surgery was called. He started on Cefazolin, and the ID consult requested to assist with further evaluation and antibiotic management.       REVIEW OF SYSTEMS: Total of twelve systems have been reviewed and found to be negative unless mentioned in HPI      PAST MEDICAL & SURGICAL HISTORY:  Medical history unknown  Surgical history unknown      SOCIAL HISTORY  Alcohol: Does not drink  Tobacco: Does not smoke  Illicit substance use: None      FAMILY HISTORY: Non contributory to the present illness      ALLERGIES: No Known Allergies      Vital Signs Last 24 Hrs  T(C): 36.7 (28 Oct 2021 13:51), Max: 36.7 (27 Oct 2021 20:05)  T(F): 98 (28 Oct 2021 13:51), Max: 98.1 (27 Oct 2021 20:05)  HR: 72 (28 Oct 2021 13:51) (68 - 79)  BP: 126/71 (28 Oct 2021 13:51) (126/71 - 168/90)  BP(mean): --  RR: 16 (28 Oct 2021 13:51) (16 - 20)  SpO2: 98% (27 Oct 2021 20:05) (98% - 98%)      PHYSICAL EXAM:  GENERAL: Not in distress   CHEST/LUNG: Not using accessory muscles   HEART: s1 and s2 present  ABDOMEN:  Nontender and  Nondistended  EXTREMITIES: Left medial thigh laceration has stiches on and looks clean  CNS: Awake and Alert      LABS:                        13.8   8.49  )-----------( 227      ( 28 Oct 2021 06:46 )             42.1       10-28    139  |  100  |  16  ----------------------------<  114<H>  4.8   |  30  |  0.9    Ca    9.7      28 Oct 2021 06:46    TPro  6.6  /  Alb  4.4  /  TBili  0.4  /  DBili  x   /  AST  14  /  ALT  17  /  AlkPhos  93  10-28    PT/INR - ( 27 Oct 2021 15:58 )   PT: 11.80 sec;   INR: 1.03 ratio      PTT - ( 27 Oct 2021 15:58 )  PTT:28.3 sec        MEDICATIONS  (STANDING):  atorvastatin 40 milliGRAM(s) Oral at bedtime  ceFAZolin   IVPB 1000 milliGRAM(s) IV Intermittent every 8 hours  heparin   Injectable 5000 Unit(s) SubCutaneous every 8 hours  lisinopril 10 milliGRAM(s) Oral daily  multivitamin 1 Tablet(s) Oral daily  pantoprazole    Tablet 40 milliGRAM(s) Oral before breakfast    MEDICATIONS  (PRN):  ALBUTerol    90 MICROgram(s) HFA Inhaler 2 Puff(s) Inhalation every 6 hours PRN Shortness of Breath and/or Wheezing  ondansetron Injectable 4 milliGRAM(s) IV Push four times a day PRN Nausea and/or Vomiting  oxycodone    5 mG/acetaminophen 325 mG 1 Tablet(s) Oral every 4 hours PRN Moderate Pain (4 - 6)        RADIOLOGY & ADDITIONAL TESTS:    < from: Xray Femur 2 Views, Left (10.27.21 @ 16:38) >  Possible soft tissue laceration involving the medial left thigh at the level of the mid femur. Correlation with physical examination is recommended.    No acute displaced fracture dislocation.    Chronic healed left mid femur fracture with postsurgical change.    Vascular calcifications.    Degenerative change        MICROBIOLOGY DATA:    COVID-19 PCR (10.27.21 @ 15:13)   COVID-19 PCR: NotDetec:

## 2021-10-29 LAB
COVID-19 SPIKE DOMAIN AB INTERP: POSITIVE
COVID-19 SPIKE DOMAIN ANTIBODY RESULT: >250 U/ML — HIGH
SARS-COV-2 IGG+IGM SERPL QL IA: >250 U/ML — HIGH
SARS-COV-2 IGG+IGM SERPL QL IA: POSITIVE

## 2021-10-29 PROCEDURE — 93970 EXTREMITY STUDY: CPT | Mod: 26

## 2021-10-29 PROCEDURE — 99231 SBSQ HOSP IP/OBS SF/LOW 25: CPT

## 2021-10-29 PROCEDURE — 88304 TISSUE EXAM BY PATHOLOGIST: CPT | Mod: 26

## 2021-10-29 RX ORDER — ACETAMINOPHEN 500 MG
650 TABLET ORAL EVERY 6 HOURS
Refills: 0 | Status: DISCONTINUED | OUTPATIENT
Start: 2021-10-29 | End: 2021-11-01

## 2021-10-29 RX ORDER — HYDROMORPHONE HYDROCHLORIDE 2 MG/ML
0.5 INJECTION INTRAMUSCULAR; INTRAVENOUS; SUBCUTANEOUS EVERY 4 HOURS
Refills: 0 | Status: DISCONTINUED | OUTPATIENT
Start: 2021-10-29 | End: 2021-11-01

## 2021-10-29 RX ORDER — CLOPIDOGREL BISULFATE 75 MG/1
75 TABLET, FILM COATED ORAL DAILY
Refills: 0 | Status: DISCONTINUED | OUTPATIENT
Start: 2021-10-29 | End: 2021-11-01

## 2021-10-29 RX ORDER — HYDROMORPHONE HYDROCHLORIDE 2 MG/ML
0.5 INJECTION INTRAMUSCULAR; INTRAVENOUS; SUBCUTANEOUS ONCE
Refills: 0 | Status: DISCONTINUED | OUTPATIENT
Start: 2021-10-29 | End: 2021-10-29

## 2021-10-29 RX ORDER — OXYCODONE HYDROCHLORIDE 5 MG/1
5 TABLET ORAL EVERY 6 HOURS
Refills: 0 | Status: DISCONTINUED | OUTPATIENT
Start: 2021-10-29 | End: 2021-11-01

## 2021-10-29 RX ORDER — ATORVASTATIN CALCIUM 80 MG/1
1 TABLET, FILM COATED ORAL
Qty: 0 | Refills: 0 | DISCHARGE
Start: 2021-10-29

## 2021-10-29 RX ORDER — KETOROLAC TROMETHAMINE 30 MG/ML
15 SYRINGE (ML) INJECTION ONCE
Refills: 0 | Status: DISCONTINUED | OUTPATIENT
Start: 2021-10-29 | End: 2021-10-29

## 2021-10-29 RX ORDER — SODIUM CHLORIDE 9 MG/ML
1000 INJECTION, SOLUTION INTRAVENOUS
Refills: 0 | Status: DISCONTINUED | OUTPATIENT
Start: 2021-10-29 | End: 2021-10-29

## 2021-10-29 RX ORDER — HYDROXYZINE HCL 10 MG
25 TABLET ORAL ONCE
Refills: 0 | Status: COMPLETED | OUTPATIENT
Start: 2021-10-29 | End: 2021-10-29

## 2021-10-29 RX ORDER — LISINOPRIL 2.5 MG/1
1 TABLET ORAL
Qty: 0 | Refills: 0 | DISCHARGE
Start: 2021-10-29

## 2021-10-29 RX ORDER — ALBUTEROL 90 UG/1
2 AEROSOL, METERED ORAL
Qty: 0 | Refills: 0 | DISCHARGE
Start: 2021-10-29

## 2021-10-29 RX ORDER — ASPIRIN/CALCIUM CARB/MAGNESIUM 324 MG
1 TABLET ORAL
Qty: 0 | Refills: 0 | DISCHARGE
Start: 2021-10-29

## 2021-10-29 RX ADMIN — HEPARIN SODIUM 5000 UNIT(S): 5000 INJECTION INTRAVENOUS; SUBCUTANEOUS at 22:29

## 2021-10-29 RX ADMIN — HYDROMORPHONE HYDROCHLORIDE 0.5 MILLIGRAM(S): 2 INJECTION INTRAMUSCULAR; INTRAVENOUS; SUBCUTANEOUS at 17:00

## 2021-10-29 RX ADMIN — HEPARIN SODIUM 5000 UNIT(S): 5000 INJECTION INTRAVENOUS; SUBCUTANEOUS at 04:58

## 2021-10-29 RX ADMIN — SODIUM CHLORIDE 100 MILLILITER(S): 9 INJECTION, SOLUTION INTRAVENOUS at 08:40

## 2021-10-29 RX ADMIN — HYDROMORPHONE HYDROCHLORIDE 0.5 MILLIGRAM(S): 2 INJECTION INTRAMUSCULAR; INTRAVENOUS; SUBCUTANEOUS at 22:58

## 2021-10-29 RX ADMIN — LISINOPRIL 10 MILLIGRAM(S): 2.5 TABLET ORAL at 04:58

## 2021-10-29 RX ADMIN — Medication 650 MILLIGRAM(S): at 18:42

## 2021-10-29 RX ADMIN — ATORVASTATIN CALCIUM 40 MILLIGRAM(S): 80 TABLET, FILM COATED ORAL at 22:31

## 2021-10-29 RX ADMIN — Medication 25 MILLIGRAM(S): at 22:41

## 2021-10-29 RX ADMIN — OXYCODONE HYDROCHLORIDE 5 MILLIGRAM(S): 5 TABLET ORAL at 18:49

## 2021-10-29 RX ADMIN — Medication 100 MILLIGRAM(S): at 14:28

## 2021-10-29 RX ADMIN — CLOPIDOGREL BISULFATE 75 MILLIGRAM(S): 75 TABLET, FILM COATED ORAL at 18:42

## 2021-10-29 RX ADMIN — HYDROMORPHONE HYDROCHLORIDE 0.5 MILLIGRAM(S): 2 INJECTION INTRAMUSCULAR; INTRAVENOUS; SUBCUTANEOUS at 22:29

## 2021-10-29 RX ADMIN — Medication 650 MILLIGRAM(S): at 18:44

## 2021-10-29 RX ADMIN — Medication 100 MILLIGRAM(S): at 04:58

## 2021-10-29 RX ADMIN — Medication 100 MILLIGRAM(S): at 22:29

## 2021-10-29 RX ADMIN — PANTOPRAZOLE SODIUM 40 MILLIGRAM(S): 20 TABLET, DELAYED RELEASE ORAL at 04:58

## 2021-10-29 RX ADMIN — SODIUM CHLORIDE 75 MILLILITER(S): 9 INJECTION, SOLUTION INTRAVENOUS at 16:59

## 2021-10-29 NOTE — DISCHARGE NOTE PROVIDER - HOSPITAL COURSE
65yo male (seems forgetful, obtained several details including medication list and PMH from surgical team) whose PMH includes CVA, HTN and HLD, presents to the ER with left thigh laceration following an accident with a /saw. Denies fevers, chills and currently no pain Dilaudid was ordered for him in the ER). In the ER he did receive IV antibiotics and general surgery preformed a skin closure procedure. Motor intact, sensation grossly intact, ambulating - seen by PT - rolling walker obtained. No orthopedic intervention. Dr Vaca from surgery to take pt bto OR today for proper washout and closure then can be dc home after when stable. 65yo male (seems forgetful, obtained several details including medication list and PMH from surgical team) whose PMH includes CVA, HTN and HLD, presents to the ER with left thigh laceration following an accident with a /saw. Denies fevers, chills and currently no pain Dilaudid was ordered for him in the ER). In the ER he did receive IV antibiotics and general surgery preformed a skin closure procedure. Motor intact, sensation grossly intact, ambulating - seen by PT - rolling walker obtained. No orthopedic intervention.     10/29:  Skin sutures removed. Hematoma evacuated from wound. No active bleeding. Exploration, irrigation, and debridement of devitalized tissue. Pulse evac used for thorough irrigation. No signs of infection. Muscle approximated. Fascia closed. HUMBERTO drain left in place. Skin closed with staples.    IV ABX continued     d/c on pod 3  with po abx     HUMBERTO in place with sero . pt to be d/c with humberto and f/u in office next tues.

## 2021-10-29 NOTE — CHART NOTE - NSCHARTNOTEFT_GEN_A_CORE
PACU ANESTHESIA ADMISSION NOTE      Procedure:   Post op diagnosis:      ____  Intubated  TV:______       Rate: ______      FiO2: ______    __x__  Patent Airway    __x__  Full return of protective reflexes    ___x_  Full recovery from anesthesia / back to baseline     Vitals:   T: 98.6           R:   18               BP:    151/81              Sat:    96               P: 88      Mental Status:  __x__ Awake   ___x__ Alert   _____ Drowsy   _____ Sedated    Nausea/Vomiting:  __x__ NO  ______Yes,   See Post - Op Orders          Pain Scale (0-10):  __0___    Treatment: ____ None    ____ See Post - Op/PCA Orders    Post - Operative Fluids:   ____ Oral   __x__ See Post - Op Orders    Plan: Discharge:   ____Home       ___x_Floor     _____Critical Care    _____  Other:_________________    Comments:

## 2021-10-29 NOTE — DISCHARGE NOTE PROVIDER - CARE PROVIDER_API CALL
Mago Vaca  SURGERY  [address] [place] [address]  Phone: [phone]  Fax: [phone]  Follow Up Time: 1 week

## 2021-10-29 NOTE — PROGRESS NOTE ADULT - ASSESSMENT
Assessment:  66y Male patient admitted S/P Large laceration to Left thigh from /saw  Was washed out and explored in ED. Closed skin.  Orthopedics saw the patient, and there is no need for orthopedic intervention at this time.   Patient seen and examined at bedside. NAD.     Plan:  General surgery to take patient to OR this afternoon for an extended washout and formal closure.   Continue antibiotics  NPO, IVF      Date/Time: 10-29-21 @ 07:46

## 2021-10-29 NOTE — PRE-ANESTHESIA EVALUATION ADULT - NSANTHOSAYNRD_GEN_A_CORE
No. AMBROSE screening performed.  STOP BANG Legend: 0-2 = LOW Risk; 3-4 = INTERMEDIATE Risk; 5-8 = HIGH Risk

## 2021-10-29 NOTE — DISCHARGE NOTE PROVIDER - NSDCCPCAREPLAN_GEN_ALL_CORE_FT
PRINCIPAL DISCHARGE DIAGNOSIS  Diagnosis: Laceration of leg  Assessment and Plan of Treatment: recieved antibiotic prophyalxis and TDAP - wound was washed out and closed by surgery - follow up with PMD and surgeon       PRINCIPAL DISCHARGE DIAGNOSIS  Diagnosis: Laceration of leg  Assessment and Plan of Treatment: recieved antibiotic prophyalxis and TDAP - wound was washed out and closed by surgery - follow up with PMD and surgeon  SACHIN in place   needs to follow up with Dr. Vaca in office 11/9/21  must take both antibiotics for 10 days   continue with all medical medicines

## 2021-10-29 NOTE — DISCHARGE NOTE PROVIDER - NSDCMRMEDTOKEN_GEN_ALL_CORE_FT
albuterol 90 mcg/inh inhalation aerosol: 2 puff(s) inhaled every 6 hours, As needed, Shortness of Breath and/or Wheezing  aspirin 81 mg oral tablet, chewable: 1 tab(s) orally once a day  atorvastatin 40 mg oral tablet: 1 tab(s) orally once a day (at bedtime)  Multiple Vitamins oral tablet: 1 tab(s) orally once a day   acetaminophen 325 mg oral tablet: 2 tab(s) orally every 6 hours  albuterol 90 mcg/inh inhalation aerosol: 2 puff(s) inhaled every 6 hours, As needed, Shortness of Breath and/or Wheezing  amoxicillin-clavulanate 875 mg-125 mg oral tablet: 1 milligram(s) orally 2 times a day   aspirin 81 mg oral tablet, chewable: 1 tab(s) orally once a day  atorvastatin 40 mg oral tablet: 1 tab(s) orally once a day (at bedtime)  Bactrim  mg-160 mg oral tablet: 1 tab(s) orally 2 times a day   clopidogrel 75 mg oral tablet: 1 tab(s) orally once a day  lisinopril 10 mg oral tablet: 1 tab(s) orally once a day  Multiple Vitamins oral tablet: 1 tab(s) orally once a day  oxycodone-acetaminophen 5 mg-325 mg oral tablet: 1 tab(s) orally 3 times a day, As Needed for pain MDD:3 tabs

## 2021-10-29 NOTE — BRIEF OPERATIVE NOTE - NSICDXBRIEFPROCEDURE_GEN_ALL_CORE_FT
PROCEDURES:  Exploration, wound, penetrating, extremity 29-Oct-2021 16:56:18 Exploration, irrigation, debridement and closure of Left lower extremity wound 2/2 Keenan Barbosa

## 2021-10-29 NOTE — CHART NOTE - NSCHARTNOTEFT_GEN_A_CORE
Vital Signs Last 24 Hrs  T(C): 36.6 (30 Oct 2021 04:22), Max: 37.2 (29 Oct 2021 18:00)  T(F): 97.8 (30 Oct 2021 04:22), Max: 98.9 (29 Oct 2021 18:00)  HR: 64 (30 Oct 2021 04:22) (50 - 94)  BP: 132/67 (30 Oct 2021 04:22) (114/55 - 171/80)  BP(mean): --  RR: 16 (30 Oct 2021 04:22) (14 - 23)  SpO2: 97% (29 Oct 2021 22:16) (97% - 98%)  S/P LEft thigh laceration wash-out and repair.  No active bleeding appreciated.

## 2021-10-29 NOTE — BRIEF OPERATIVE NOTE - OPERATION/FINDINGS
Skin sutures removed. Hematoma evacuated from wound. No active bleeding. Exploration, irrigation, and debridement of devitalized tissue. Pulse evac used for thorough irrigation. No signs of infection. Muscle approximated. Fascia closed. SACHIN drain left in place. Skin closed with staples.

## 2021-10-29 NOTE — CONSULT NOTE ADULT - SUBJECTIVE AND OBJECTIVE BOX
I actually  Patient seen at bedside, states he is feeling better. I actually admitted this patient and he was seen earlier today by hospitalist who performed full assessment and plan. No changes made to this plan Patient seen at bedside, states he is feeling better. I actually admitted this patient on 10/27/2021 and he was seen earlier today by hospitalist who performed full assessment and plan. No changes made to this plan

## 2021-10-29 NOTE — PROGRESS NOTE ADULT - ASSESSMENT
Patient is a 66y old  Male whose PMH includes CVA, HTN and HLD, presents to the ER for evaluation of left thigh laceration following an accident with a . No fever, or chills.  ON admission, he found to have no fever and No Leukocytosis. He did received IV antibiotics and general surgery preformed a skin closure procedure. Because of muscle (and likely nerve) involvement, orthopedist surgery was called. He started on Cefazolin, and the ID consult requested to assist with further evaluation and antibiotic management.     # Left medial thigh  laceration wound infection- s/p wound irrigation and closed in ER, no culture is in the system    Recommendations  - planned for OR washout   - continue cefazolin for now - will follow-up OR cultures  - Pain management as needed    Please call or message on Microsoft Teams if with any questions.  Spectra [phone]

## 2021-10-29 NOTE — PROGRESS NOTE ADULT - ASSESSMENT
#laceration to left thigh - sp suture closure by surgery Dr Salguero  on abx ppx - can change to keflex for 3 days on dc  seen by ortho - no intervention   ambulate pt - needs rolling walker  spoke with Dr salguero on phone - will take pt to OR today for proper washout and closure then can be dc home after  pt has his own car here  will hold plavix and resume upon dc  #laceration to left thigh - sp suture closure by surgery Dr Salguero  on abx ppx - can change to augmentin for 5 days upon dc   seen by ortho - no intervention   ambulate pt - needs rolling walker  spoke with Dr salguero on phone - will take pt to OR today for proper washout and closure then can be dc home after  pt has his own car here  will hold plavix and resume upon dc

## 2021-10-30 LAB
ANION GAP SERPL CALC-SCNC: 11 MMOL/L — SIGNIFICANT CHANGE UP (ref 7–14)
BASOPHILS # BLD AUTO: 0.04 K/UL — SIGNIFICANT CHANGE UP (ref 0–0.2)
BASOPHILS NFR BLD AUTO: 0.5 % — SIGNIFICANT CHANGE UP (ref 0–1)
BUN SERPL-MCNC: 16 MG/DL — SIGNIFICANT CHANGE UP (ref 10–20)
CALCIUM SERPL-MCNC: 8.7 MG/DL — SIGNIFICANT CHANGE UP (ref 8.5–10.1)
CHLORIDE SERPL-SCNC: 101 MMOL/L — SIGNIFICANT CHANGE UP (ref 98–110)
CO2 SERPL-SCNC: 28 MMOL/L — SIGNIFICANT CHANGE UP (ref 17–32)
CREAT SERPL-MCNC: 1 MG/DL — SIGNIFICANT CHANGE UP (ref 0.7–1.5)
EOSINOPHIL # BLD AUTO: 0.08 K/UL — SIGNIFICANT CHANGE UP (ref 0–0.7)
EOSINOPHIL NFR BLD AUTO: 1 % — SIGNIFICANT CHANGE UP (ref 0–8)
GLUCOSE SERPL-MCNC: 109 MG/DL — HIGH (ref 70–99)
HCT VFR BLD CALC: 44.2 % — SIGNIFICANT CHANGE UP (ref 42–52)
HGB BLD-MCNC: 14.5 G/DL — SIGNIFICANT CHANGE UP (ref 14–18)
IMM GRANULOCYTES NFR BLD AUTO: 0.4 % — HIGH (ref 0.1–0.3)
LYMPHOCYTES # BLD AUTO: 1.23 K/UL — SIGNIFICANT CHANGE UP (ref 1.2–3.4)
LYMPHOCYTES # BLD AUTO: 15.1 % — LOW (ref 20.5–51.1)
MAGNESIUM SERPL-MCNC: 1.9 MG/DL — SIGNIFICANT CHANGE UP (ref 1.8–2.4)
MCHC RBC-ENTMCNC: 31.3 PG — HIGH (ref 27–31)
MCHC RBC-ENTMCNC: 32.8 G/DL — SIGNIFICANT CHANGE UP (ref 32–37)
MCV RBC AUTO: 95.3 FL — HIGH (ref 80–94)
MONOCYTES # BLD AUTO: 0.63 K/UL — HIGH (ref 0.1–0.6)
MONOCYTES NFR BLD AUTO: 7.7 % — SIGNIFICANT CHANGE UP (ref 1.7–9.3)
NEUTROPHILS # BLD AUTO: 6.15 K/UL — SIGNIFICANT CHANGE UP (ref 1.4–6.5)
NEUTROPHILS NFR BLD AUTO: 75.3 % — HIGH (ref 42.2–75.2)
NRBC # BLD: 0 /100 WBCS — SIGNIFICANT CHANGE UP (ref 0–0)
PHOSPHATE SERPL-MCNC: 3.1 MG/DL — SIGNIFICANT CHANGE UP (ref 2.1–4.9)
PLATELET # BLD AUTO: 245 K/UL — SIGNIFICANT CHANGE UP (ref 130–400)
POTASSIUM SERPL-MCNC: 4.7 MMOL/L — SIGNIFICANT CHANGE UP (ref 3.5–5)
POTASSIUM SERPL-SCNC: 4.7 MMOL/L — SIGNIFICANT CHANGE UP (ref 3.5–5)
RBC # BLD: 4.64 M/UL — LOW (ref 4.7–6.1)
RBC # FLD: 14.3 % — SIGNIFICANT CHANGE UP (ref 11.5–14.5)
SODIUM SERPL-SCNC: 140 MMOL/L — SIGNIFICANT CHANGE UP (ref 135–146)
WBC # BLD: 8.16 K/UL — SIGNIFICANT CHANGE UP (ref 4.8–10.8)
WBC # FLD AUTO: 8.16 K/UL — SIGNIFICANT CHANGE UP (ref 4.8–10.8)

## 2021-10-30 PROCEDURE — 99222 1ST HOSP IP/OBS MODERATE 55: CPT

## 2021-10-30 RX ADMIN — PANTOPRAZOLE SODIUM 40 MILLIGRAM(S): 20 TABLET, DELAYED RELEASE ORAL at 06:09

## 2021-10-30 RX ADMIN — HYDROMORPHONE HYDROCHLORIDE 0.5 MILLIGRAM(S): 2 INJECTION INTRAMUSCULAR; INTRAVENOUS; SUBCUTANEOUS at 21:55

## 2021-10-30 RX ADMIN — Medication 100 MILLIGRAM(S): at 21:21

## 2021-10-30 RX ADMIN — Medication 650 MILLIGRAM(S): at 06:26

## 2021-10-30 RX ADMIN — HYDROMORPHONE HYDROCHLORIDE 0.5 MILLIGRAM(S): 2 INJECTION INTRAMUSCULAR; INTRAVENOUS; SUBCUTANEOUS at 06:19

## 2021-10-30 RX ADMIN — HEPARIN SODIUM 5000 UNIT(S): 5000 INJECTION INTRAVENOUS; SUBCUTANEOUS at 14:21

## 2021-10-30 RX ADMIN — Medication 650 MILLIGRAM(S): at 18:21

## 2021-10-30 RX ADMIN — HYDROMORPHONE HYDROCHLORIDE 0.5 MILLIGRAM(S): 2 INJECTION INTRAMUSCULAR; INTRAVENOUS; SUBCUTANEOUS at 14:22

## 2021-10-30 RX ADMIN — HEPARIN SODIUM 5000 UNIT(S): 5000 INJECTION INTRAVENOUS; SUBCUTANEOUS at 06:10

## 2021-10-30 RX ADMIN — Medication 1 TABLET(S): at 11:29

## 2021-10-30 RX ADMIN — Medication 81 MILLIGRAM(S): at 11:29

## 2021-10-30 RX ADMIN — LISINOPRIL 10 MILLIGRAM(S): 2.5 TABLET ORAL at 06:09

## 2021-10-30 RX ADMIN — HYDROMORPHONE HYDROCHLORIDE 0.5 MILLIGRAM(S): 2 INJECTION INTRAMUSCULAR; INTRAVENOUS; SUBCUTANEOUS at 21:27

## 2021-10-30 RX ADMIN — Medication 100 MILLIGRAM(S): at 06:10

## 2021-10-30 RX ADMIN — Medication 650 MILLIGRAM(S): at 11:29

## 2021-10-30 RX ADMIN — CLOPIDOGREL BISULFATE 75 MILLIGRAM(S): 75 TABLET, FILM COATED ORAL at 11:29

## 2021-10-30 RX ADMIN — Medication 100 MILLIGRAM(S): at 14:21

## 2021-10-30 RX ADMIN — HYDROMORPHONE HYDROCHLORIDE 0.5 MILLIGRAM(S): 2 INJECTION INTRAMUSCULAR; INTRAVENOUS; SUBCUTANEOUS at 14:25

## 2021-10-30 RX ADMIN — Medication 650 MILLIGRAM(S): at 06:10

## 2021-10-30 RX ADMIN — HYDROMORPHONE HYDROCHLORIDE 0.5 MILLIGRAM(S): 2 INJECTION INTRAMUSCULAR; INTRAVENOUS; SUBCUTANEOUS at 06:48

## 2021-10-30 RX ADMIN — HEPARIN SODIUM 5000 UNIT(S): 5000 INJECTION INTRAVENOUS; SUBCUTANEOUS at 21:21

## 2021-10-30 RX ADMIN — Medication 650 MILLIGRAM(S): at 14:17

## 2021-10-30 RX ADMIN — ATORVASTATIN CALCIUM 40 MILLIGRAM(S): 80 TABLET, FILM COATED ORAL at 21:21

## 2021-10-30 RX ADMIN — Medication 650 MILLIGRAM(S): at 18:22

## 2021-10-30 NOTE — PHYSICAL THERAPY INITIAL EVALUATION ADULT - RANGE OF MOTION EXAMINATION, REHAB EVAL
no ROM deficits were identified
R LE AROM grossly WFL, L knee flexion/extension about 0-90 degrees, normal L ankle ROM, B UE AROM grossly WFL

## 2021-10-30 NOTE — PHYSICAL THERAPY INITIAL EVALUATION ADULT - GAIT DEVIATIONS NOTED, PT EVAL
forward flexed trunk, dec heel strike and push off, dec WB on LLE with antalgic qualities/decreased bushra/decreased step length/decreased stride length/decreased weight-shifting ability
decreased bushra/decreased step length/decreased stride length

## 2021-10-30 NOTE — PHYSICAL THERAPY INITIAL EVALUATION ADULT - GENERAL OBSERVATIONS, REHAB EVAL
Pt encountered semi-reclined in bed, NAD, +dressing L thigh with SACHIN drain, ok to be seen by PT as confirmed by RN and pt agreeable. +chart reviewed Pt encountered semi-reclined in bed, NAD, +dressing L thigh with SACHIN drain, ok to be seen by PT as confirmed by RN and pt agreeable. +chart reviewed - preliminary LE doppler negative

## 2021-10-30 NOTE — PHYSICAL THERAPY INITIAL EVALUATION ADULT - PERTINENT HX OF CURRENT PROBLEM, REHAB EVAL
Pt presents to ED with laceration of L thigh after accident with  at work ; s/p washout and repair of laceration 10/29
Laceration of Leg

## 2021-10-30 NOTE — PHYSICAL THERAPY INITIAL EVALUATION ADULT - ADDITIONAL COMMENTS
Pt states that prior to accident he was ambulating independently without AD. He lives in a private house - no steps to enter and stays on 1st level.
Pt lives with wife in house with no steps. Pt is independent with amb PTA.

## 2021-10-30 NOTE — PHYSICAL THERAPY INITIAL EVALUATION ADULT - MANUAL MUSCLE TESTING RESULTS, REHAB EVAL
R LE 5/5, L LE not tested due to injury
R LE grossly 4/5, LLE not formally assessed - clinical judgement suggests L knee 3-/5, L ankle 3+/5

## 2021-10-30 NOTE — CONSULT NOTE ADULT - SUBJECTIVE AND OBJECTIVE BOX
IVETT ANDREWS     66y     Male    [id number]                                                           CC:Patient is a 66y old  Male who presents with a chief complaint of laceration  (29 Oct 2021 10:47)      HPI:  67yo male (seems forgetful, obtained several details including medication list and PMH from surgical team) whose PMH includes CVA, HTN and HLD, presents to the ER with left thigh laceration following an accident with a . Denies fevers, chills and currently no pain Dilaudid was ordered for him in the ER). In the ER he did receive IV antibiotics and general surgery preformed a skin closure procedure. Because of muscle (and likely nerve) involvement, orthopedist surgery was called (27 Oct 2021 19:20)      This is a 66y year old Male presenting with wound which was addressed with surgery.  Was on aspirin and plavix for suspected stroke however was placed on it according to patient by his cardiologist.  Had incidental stroke seen on a visit to [facility] ED 2 years ago and was started on aspirin at that time.  Later his cardiologist added plavix.  Currently patient back on aspirin and plavix and no imaging available in [facility] to assess stroke risk or prior events.    ROS:  Constitutional, Neurological, Psychiatric, Eyes, ENT, Cardiovascular, Respiratory, Gastrointestinal, Genitourinary, Musculoskeletal, Integumentary, Endocrine and Heme/Lymph are otherwise negative. Except for noted above    Social History: No smoking, No drinking, No drug use    FAMILY HISTORY:  Family history unknown                Vital Signs Last 24 Hrs  T(C): 36.6 (30 Oct 2021 04:22), Max: 37.2 (29 Oct 2021 18:00)  T(F): 97.8 (30 Oct 2021 04:22), Max: 98.9 (29 Oct 2021 18:00)  HR: 64 (30 Oct 2021 04:22) (50 - 94)  BP: 132/67 (30 Oct 2021 04:22) (114/55 - 171/80)  BP(mean): --  RR: 16 (30 Oct 2021 04:22) (14 - 23)  SpO2: 97% (29 Oct 2021 22:16) (97% - 98%)    Physical Exam:  Constitutional: alert and in no acute distress.  Eyes: the sclera and conjunctiva were normal, pupils were equal in size, round, reactive to light, with normal accommodation and extraocular movements were intact.   Back: no costovertebral angle tenderness and no spinal tenderness.      Neuro Exam:  Orientation: oriented to person, oriented to place and oriented to time.   Attention: normal concentrating ability and visual attention was not decreased.   Language: no difficulty naming common objects, no difficulty repeating a phrase, no difficulty writing a sentence, fluency intact, comprehension intact and reading intact.   Fund of knowledge: displays adequate knowledge of personal past history.   Cranial Nerves: visual fields full to confrontation, pupils equal round and reactive to light, extraocular motion intact, facial sensation intact symmetrically, slight left facial, hearing was intact bilaterally, tongue and palate midline, head turning and shoulder shrug symmetric and there was no tongue deviation with protrusion.   Motor: muscle tone was normal in all four extremities, muscle strength was normal in all four extremities and normal bulk in all four extremities.   Sensory exam: light touch was intact.   Coordination:. gait deferred. there was no past-pointing. no tremor present.   Deep tendon reflexes:   absent throughout   Plantar responses normal on the right, normal on the left.      NIHSS: 1    Allergies    No Known Allergies    Intolerances       Home Medications:  albuterol 90 mcg/inh inhalation aerosol: 2 puff(s) inhaled every 6 hours, As needed, Shortness of Breath and/or Wheezing (29 Oct 2021 10:50)  aspirin 81 mg oral tablet, chewable: 1 tab(s) orally once a day (29 Oct 2021 10:50)  atorvastatin 40 mg oral tablet: 1 tab(s) orally once a day (at bedtime) (29 Oct 2021 10:50)  lisinopril 10 mg oral tablet: 1 tab(s) orally once a day (29 Oct 2021 16:17)  Multiple Vitamins oral tablet: 1 tab(s) orally once a day (29 Oct 2021 10:50)      MEDICATIONS  (STANDING):  acetaminophen     Tablet .. 650 milliGRAM(s) Oral every 6 hours  aspirin  chewable 81 milliGRAM(s) Oral daily  atorvastatin 40 milliGRAM(s) Oral at bedtime  ceFAZolin   IVPB 1000 milliGRAM(s) IV Intermittent every 8 hours  clopidogrel Tablet 75 milliGRAM(s) Oral daily  heparin   Injectable 5000 Unit(s) SubCutaneous every 8 hours  lisinopril 10 milliGRAM(s) Oral daily  multivitamin 1 Tablet(s) Oral daily  pantoprazole    Tablet 40 milliGRAM(s) Oral before breakfast    MEDICATIONS  (PRN):  ALBUTerol    90 MICROgram(s) HFA Inhaler 2 Puff(s) Inhalation every 6 hours PRN Shortness of Breath and/or Wheezing  HYDROmorphone  Injectable 0.5 milliGRAM(s) IV Push every 4 hours PRN Severe Pain (7 - 10)  ondansetron Injectable 4 milliGRAM(s) IV Push four times a day PRN Nausea and/or Vomiting  oxyCODONE    IR 5 milliGRAM(s) Oral every 6 hours PRN Moderate Pain (4 - 6)      LABS:                        14.5   8.16  )-----------( 245      ( 30 Oct 2021 08:05 )             44.2                   Neuro Imaging:  NCHCT:   None      Assessment / Plan: This is a 66y year old Male presenting with wound which was addressed with surgery.  Was on aspirin and plavix for suspected stroke however was placed on it according to patient by his cardiologist.  Had incidental stroke seen on a visit to [facility] ED 2 years ago and was started on aspirin at that time.  Later his cardiologist added plavix.  Currently patient back on aspirin and plavix and no imaging available in [facility] to assess stroke risk or prior events. As he presented for a specific issues unrelated to his history of stroke and is currently back on aspirin and plavix it is not necessary to hold patient in hospital for workup for stroke and can be discharged form neurological perspective and followup in stroke clinic ([phone]).  1. Follow up in stroke clinic  2. Recall as needed

## 2021-10-30 NOTE — PROGRESS NOTE ADULT - ASSESSMENT
Assessment:  66y Male patient admitted S/P traumatic laceration with saw to Left thigh involving muscle.   Patient seen and examined at bedside. NAD.     Patient is now s/p OR washout, debridement, and approximation. SACHIN drain left in place.     Plan:  - Continue IV antibiotic  - Neuro consult appreciated  - COntinue ASA, Plavix, HSQ  - PT today  - Pain control  - Monitor vitals  - Monitor labs and replete as necessary  - Monitor for bowel function  - Monitor urine output  - Encourage ambulation as tolerated  - Follow PT/Physiatry      Date/Time: 10-30-21 @ 09:48

## 2021-10-30 NOTE — PHYSICAL THERAPY INITIAL EVALUATION ADULT - TRANSFER SAFETY CONCERNS NOTED: SIT/STAND, REHAB EVAL
decreased balance during turns/decreased safety awareness/losing balance/decreased weight-shifting ability
decreased step length

## 2021-10-31 LAB
ANION GAP SERPL CALC-SCNC: 12 MMOL/L — SIGNIFICANT CHANGE UP (ref 7–14)
BUN SERPL-MCNC: 15 MG/DL — SIGNIFICANT CHANGE UP (ref 10–20)
CALCIUM SERPL-MCNC: 8.8 MG/DL — SIGNIFICANT CHANGE UP (ref 8.5–10.1)
CHLORIDE SERPL-SCNC: 102 MMOL/L — SIGNIFICANT CHANGE UP (ref 98–110)
CO2 SERPL-SCNC: 26 MMOL/L — SIGNIFICANT CHANGE UP (ref 17–32)
CREAT SERPL-MCNC: 1 MG/DL — SIGNIFICANT CHANGE UP (ref 0.7–1.5)
GLUCOSE SERPL-MCNC: 109 MG/DL — HIGH (ref 70–99)
HCT VFR BLD CALC: 44.6 % — SIGNIFICANT CHANGE UP (ref 42–52)
HGB BLD-MCNC: 14.5 G/DL — SIGNIFICANT CHANGE UP (ref 14–18)
MCHC RBC-ENTMCNC: 31.3 PG — HIGH (ref 27–31)
MCHC RBC-ENTMCNC: 32.5 G/DL — SIGNIFICANT CHANGE UP (ref 32–37)
MCV RBC AUTO: 96.1 FL — HIGH (ref 80–94)
NRBC # BLD: 0 /100 WBCS — SIGNIFICANT CHANGE UP (ref 0–0)
PLATELET # BLD AUTO: 224 K/UL — SIGNIFICANT CHANGE UP (ref 130–400)
POTASSIUM SERPL-MCNC: 5.1 MMOL/L — HIGH (ref 3.5–5)
POTASSIUM SERPL-SCNC: 5.1 MMOL/L — HIGH (ref 3.5–5)
RBC # BLD: 4.64 M/UL — LOW (ref 4.7–6.1)
RBC # FLD: 14.1 % — SIGNIFICANT CHANGE UP (ref 11.5–14.5)
SODIUM SERPL-SCNC: 140 MMOL/L — SIGNIFICANT CHANGE UP (ref 135–146)
WBC # BLD: 7.26 K/UL — SIGNIFICANT CHANGE UP (ref 4.8–10.8)
WBC # FLD AUTO: 7.26 K/UL — SIGNIFICANT CHANGE UP (ref 4.8–10.8)

## 2021-10-31 RX ADMIN — HEPARIN SODIUM 5000 UNIT(S): 5000 INJECTION INTRAVENOUS; SUBCUTANEOUS at 21:30

## 2021-10-31 RX ADMIN — Medication 650 MILLIGRAM(S): at 11:16

## 2021-10-31 RX ADMIN — Medication 650 MILLIGRAM(S): at 11:17

## 2021-10-31 RX ADMIN — Medication 81 MILLIGRAM(S): at 11:17

## 2021-10-31 RX ADMIN — HEPARIN SODIUM 5000 UNIT(S): 5000 INJECTION INTRAVENOUS; SUBCUTANEOUS at 14:06

## 2021-10-31 RX ADMIN — Medication 100 MILLIGRAM(S): at 14:03

## 2021-10-31 RX ADMIN — Medication 650 MILLIGRAM(S): at 05:16

## 2021-10-31 RX ADMIN — Medication 100 MILLIGRAM(S): at 05:16

## 2021-10-31 RX ADMIN — Medication 1 TABLET(S): at 11:17

## 2021-10-31 RX ADMIN — ATORVASTATIN CALCIUM 40 MILLIGRAM(S): 80 TABLET, FILM COATED ORAL at 21:30

## 2021-10-31 RX ADMIN — Medication 100 MILLIGRAM(S): at 21:29

## 2021-10-31 RX ADMIN — LISINOPRIL 10 MILLIGRAM(S): 2.5 TABLET ORAL at 05:16

## 2021-10-31 RX ADMIN — CLOPIDOGREL BISULFATE 75 MILLIGRAM(S): 75 TABLET, FILM COATED ORAL at 11:17

## 2021-10-31 RX ADMIN — Medication 650 MILLIGRAM(S): at 16:56

## 2021-10-31 RX ADMIN — HEPARIN SODIUM 5000 UNIT(S): 5000 INJECTION INTRAVENOUS; SUBCUTANEOUS at 05:16

## 2021-10-31 RX ADMIN — PANTOPRAZOLE SODIUM 40 MILLIGRAM(S): 20 TABLET, DELAYED RELEASE ORAL at 05:16

## 2021-10-31 NOTE — PROGRESS NOTE ADULT - ASSESSMENT
Assessment:  66y Male patient admitted S/P traumatic laceration with saw to Left thigh involving muscle.   Patient is now s/p OR washout, debridement, and approximation --POD # 2.        Plan:  - Continue IV antibiotic with Ancef.  - Neuro consult appreciated-- Continue Plavix and ASA and follow up with Neurology for an outpt W/U to see if needs to continue Plavix for hx of CVA.  - Continue with ASA, Plavix, HSQ.  - Continue WBAT with PT today.  - Pain control as needed.  - Monitor SACHIN drain output and document q shift.   - Monitor vitals.  - Monitor labs and replete as necessary.  - Encouraged ambulation as tolerated with assistive device.   - Continue home medications for comorbidities.   - Case d/w Dr. Vaca.

## 2021-10-31 NOTE — PROGRESS NOTE ADULT - ASSESSMENT
Patient is a 66y old  Male whose PMH includes CVA, HTN and HLD, presents to the ER for evaluation of left thigh laceration following an accident with a . No fever, or chills.  ON admission, he found to have no fever and No Leukocytosis. He did received IV antibiotics and general surgery preformed a skin closure procedure. Because of muscle (and likely nerve) involvement, orthopedist surgery was called. He started on Cefazolin, and the ID consult requested to assist with further evaluation and antibiotic management.     # Left medial thigh  laceration wound infection- s/p wound irrigation and closed in ER, - s/p wash and irrigation in OR- CX has no growth    would recommend:    1. Continue Cefazolin for now  2. May change to oral on discharge when improved  3. Pain management as needed    Attending Attestation:    Spent more than 35 minutes on total encounter, more than 50 % of the visit was spent counseling and/or coordinating care by the Attending physician.           Patient is a 66y old  Male whose PMH includes CVA, HTN and HLD, presents to the ER for evaluation of left thigh laceration following an accident with a . No fever, or chills.  ON admission, he found to have no fever and No Leukocytosis. He did received IV antibiotics and general surgery preformed a skin closure procedure. Because of muscle (and likely nerve) involvement, orthopedist surgery was called. He started on Cefazolin, and the ID consult requested to assist with further evaluation and antibiotic management.     # Left medial thigh  laceration wound infection- s/p wound irrigation and closed in ER, - s/p wash and irrigation in OR- CX has no growth    would recommend:    1. Continue Cefazolin for now  2. May change to oral on discharge to complete the course  3. Pain management as needed    Attending Attestation:    Spent more than 35 minutes on total encounter, more than 50 % of the visit was spent counseling and/or coordinating care by the Attending physician.

## 2021-11-01 VITALS
DIASTOLIC BLOOD PRESSURE: 77 MMHG | RESPIRATION RATE: 18 BRPM | SYSTOLIC BLOOD PRESSURE: 130 MMHG | TEMPERATURE: 98 F | HEART RATE: 93 BPM

## 2021-11-01 RX ORDER — CLOPIDOGREL BISULFATE 75 MG/1
1 TABLET, FILM COATED ORAL
Qty: 0 | Refills: 0 | DISCHARGE
Start: 2021-11-01

## 2021-11-01 RX ORDER — AZTREONAM 2 G
1 VIAL (EA) INJECTION
Qty: 20 | Refills: 0
Start: 2021-11-01 | End: 2021-11-10

## 2021-11-01 RX ORDER — ACETAMINOPHEN 500 MG
2 TABLET ORAL
Qty: 0 | Refills: 0 | DISCHARGE
Start: 2021-11-01

## 2021-11-01 RX ADMIN — Medication 650 MILLIGRAM(S): at 05:48

## 2021-11-01 RX ADMIN — Medication 100 MILLIGRAM(S): at 05:47

## 2021-11-01 RX ADMIN — PANTOPRAZOLE SODIUM 40 MILLIGRAM(S): 20 TABLET, DELAYED RELEASE ORAL at 05:48

## 2021-11-01 RX ADMIN — LISINOPRIL 10 MILLIGRAM(S): 2.5 TABLET ORAL at 05:48

## 2021-11-01 RX ADMIN — Medication 1 TABLET(S): at 11:39

## 2021-11-01 RX ADMIN — CLOPIDOGREL BISULFATE 75 MILLIGRAM(S): 75 TABLET, FILM COATED ORAL at 11:39

## 2021-11-01 RX ADMIN — HEPARIN SODIUM 5000 UNIT(S): 5000 INJECTION INTRAVENOUS; SUBCUTANEOUS at 05:48

## 2021-11-01 RX ADMIN — Medication 650 MILLIGRAM(S): at 00:19

## 2021-11-01 RX ADMIN — Medication 81 MILLIGRAM(S): at 11:39

## 2021-11-01 RX ADMIN — Medication 650 MILLIGRAM(S): at 11:39

## 2021-11-01 NOTE — DISCHARGE NOTE NURSING/CASE MANAGEMENT/SOCIAL WORK - PATIENT PORTAL LINK FT
You can access the FollowMyHealth Patient Portal offered by [facility] by registering at the following website: [email or web address]. By joining [email or web address]’s FollowMyHealth portal, you will also be able to view your health information using other applications (apps) compatible with our system.

## 2021-11-01 NOTE — PROGRESS NOTE ADULT - ASSESSMENT
66y Male patient admitted S/P traumatic laceration with saw to Left thigh involving muscle.   Patient is now s/p OR washout, debridement, and approximation --POD # 3        Plan:  - Continue IV antibiotic with Ancef.  - Neuro consult appreciated-- Continue Plavix and ASA and follow up with Neurology for an outpt W/U to see if needs to continue Plavix for hx of CVA.  - Continue with ASA, Plavix, HSQ.  - Continue WBAT with PT today.  - Pain control as needed.  - Monitor SACHIN drain output and document q shift.   - Monitor vitals.  - Monitor labs and replete as necessary.  - Encouraged ambulation as tolerated with assistive device.   - Continue home medications for comorbidities.   - Case d/w Dr. Vaca.     POSSIBLE D/C today with PO ABX

## 2021-11-01 NOTE — CHART NOTE - NSCHARTNOTEFT_GEN_A_CORE
d/w dr. salguero     d/c pt with po abx - augmentin and bactrim x 10 days   pain meds   and keep SACHIN in place     f/u in office next tuesday

## 2021-11-01 NOTE — DISCHARGE NOTE NURSING/CASE MANAGEMENT/SOCIAL WORK - NSDCVIVACCINE_GEN_ALL_CORE_FT
Tdap; 27-Oct-2021 15:28; Ozzie Yang (RN); [employer]; [id number] (Exp. Date: 09-Sep-2023); IntraMuscular; Deltoid Left.; 0.5 milliLiter(s); VIS (VIS Published: 09-May-2013, VIS Presented: 27-Oct-2021);

## 2021-11-01 NOTE — PROGRESS NOTE ADULT - SUBJECTIVE AND OBJECTIVE BOX
DARRYLIVETT  66y, Male  Allergy: No Known Allergies      CHIEF COMPLAINT:     HPI:  65yo male (seems forgetful, obtained several details including medication list and PMH from surgical team) whose PMH includes CVA, HTN and HLD, presents to the ER with left thigh laceration following an accident with a . Denies fevers, chills and currently no pain Dilaudid was ordered for him in the ER). In the ER he did receive IV antibiotics and general surgery preformed a skin closure procedure. Because of muscle (and likely nerve) involvement, orthopedist surgery was called (27 Oct 2021 19:20)    HPI:    FAMILY HISTORY:  Family history unknown      PAST MEDICAL & SURGICAL HISTORY:  Medical history unknown    Surgical history unknown        SOCIAL HISTORY  Social History:  stopped tobacco and alcohol use previously (27 Oct 2021 19:20)        ROS  General: Denies fevers, chills, nightsweats, weight loss  HEENT: Denies headache, rhinorrhea, sore throat, eye pain  CV: Denies CP, palpitations  PULM: Denies SOB, cough  GI: Denies abdominal pain, diarrhea  : Denies dysuria, hematuria  MSK: lef tleg pain around suture site  SKIN: Denies rash   NEURO: Denies paresthesias, weakness  PSYCH: Denies depression    VITALS:  ICU Vital Signs Last 24 Hrs  T(C): 36.6 (29 Oct 2021 05:00), Max: 36.7 (28 Oct 2021 13:51)  T(F): 97.9 (29 Oct 2021 05:00), Max: 98 (28 Oct 2021 13:51)  HR: 86 (29 Oct 2021 05:00) (72 - 86)  BP: 126/79 (29 Oct 2021 05:00) (122/59 - 126/79)  BP(mean): --  ABP: --  ABP(mean): --  RR: 16 (29 Oct 2021 05:00) (16 - 18)  SpO2: --    PHYSICAL EXAM:  Gen: NAD, resting in bed  HEENT: Normocephalic, atraumatic  Neck: supple, no lymphadenopathy  CV: Regular rate & regular rhythm  Lungs: decreased BS at bases, no fremitus  Abdomen: Soft, BS present  Ext: left thigh dressed, mild pain around site, FROM hip to toes +pulses  sensation grossly intact   Neuro: non focal, awake  Skin: no rash, no erythema  Psych: no SI, HI, Hallucination     TESTS & MEASUREMENTS:                        13.8   8.49  )-----------( 227      ( 28 Oct 2021 06:46 )             42.1     10-28    139  |  100  |  16  ----------------------------<  114<H>  4.8   |  30  |  0.9    Ca    9.7      28 Oct 2021 06:46    TPro  6.6  /  Alb  4.4  /  TBili  0.4  /  DBili  x   /  AST  14  /  ALT  17  /  AlkPhos  93  10-28    eGFR if Non African American: 89 mL/min/1.73M2 (10-28-21 @ 06:46)  eGFR if : 103 mL/min/1.73M2 (10-28-21 @ 06:46)  eGFR if Non African American: 63 mL/min/1.73M2 (10-27-21 @ 15:58)  eGFR if : 73 mL/min/1.73M2 (10-27-21 @ 15:58)    LIVER FUNCTIONS - ( 28 Oct 2021 06:46 )  Alb: 4.4 g/dL / Pro: 6.6 g/dL / ALK PHOS: 93 U/L / ALT: 17 U/L / AST: 14 U/L / GGT: x                   QRS axis to [] ° and NSR at a rate of [] BPM. There was no atrial enlargement. There was no ventricular hypertrophy. There were no ST-T changes and all intervals were normal.      INFECTIOUS DISEASES TESTING      RADIOLOGY & ADDITIONAL TESTS:  I have personally reviewed the last Chest xray  CXR      CT      CARDIOLOGY TESTING  12 Lead ECG:   Ventricular Rate 86 BPM    Atrial Rate 86 BPM    P-R Interval 164 ms    QRS Duration 106 ms    Q-T Interval 370 ms    QTC Calculation(Bazett) 442 ms    P Axis 45 degrees    R Axis -35 degrees    T Axis 47 degrees    Diagnosis Line Sinus rhythm withPremature atrial complexes  Left axis deviation  Abnormal ECG    Confirmed by STAS DAVIDSON MD ([id number]) on 10/28/2021 12:40:08 PM (10-27-21 @ 16:14)      MEDICATIONS  atorvastatin 40  ceFAZolin   IVPB 1000  heparin   Injectable 5000  lisinopril 10  multivitamin 1  pantoprazole    Tablet 40      ANTIBIOTICS:  ceFAZolin   IVPB 1000 milliGRAM(s) IV Intermittent every 8 hours      All available historical data has been reviewed    ASSESSMENT  66y M admitted with LACERATION OF LEG        PROBLEMS  
  Patient is seen and examined at the bed side, is afebrile. The wound culture has no growth to date.      REVIEW OF SYSTEMS: All other review systems are negative      ALLERGIES: No Known Allergies      Vital Signs Last 24 Hrs  T(C): 36.7 (31 Oct 2021 20:56), Max: 36.7 (31 Oct 2021 20:56)  T(F): 98 (31 Oct 2021 20:56), Max: 98 (31 Oct 2021 20:56)  HR: 75 (31 Oct 2021 20:56) (75 - 84)  BP: 128/73 (31 Oct 2021 20:56) (128/73 - 146/80)  BP(mean): --  RR: 16 (31 Oct 2021 20:56) (16 - 16)  SpO2: --      PHYSICAL EXAM:  GENERAL: Not in distress   CHEST/LUNG: Not using accessory muscles   HEART: s1 and s2 present  ABDOMEN:  Nontender and  Nondistended  EXTREMITIES: Left medial thigh laceration has stiches on and looks clean  CNS: Awake and Alert      LABS:                          14.5   7.26  )-----------( 224      ( 31 Oct 2021 07:17 )             44.6                           13.8   8.49  )-----------( 227      ( 28 Oct 2021 06:46 )             42.1       10-31    140  |  102  |  15  ----------------------------<  109<H>  5.1<H>   |  26  |  1.0    Ca    8.8      31 Oct 2021 07:17  Phos  3.1     10-30  Mg     1.9     10-30      10-28    139  |  100  |  16  ----------------------------<  114<H>  4.8   |  30  |  0.9    Ca    9.7      28 Oct 2021 06:46    TPro  6.6  /  Alb  4.4  /  TBili  0.4  /  DBili  x   /  AST  14  /  ALT  17  /  AlkPhos  93  10-28    PT/INR - ( 27 Oct 2021 15:58 )   PT: 11.80 sec;   INR: 1.03 ratio      PTT - ( 27 Oct 2021 15:58 )  PTT:28.3 sec        MEDICATIONS  (STANDING):    acetaminophen     Tablet .. 650 milliGRAM(s) Oral every 6 hours  aspirin  chewable 81 milliGRAM(s) Oral daily  atorvastatin 40 milliGRAM(s) Oral at bedtime  ceFAZolin   IVPB 1000 milliGRAM(s) IV Intermittent every 8 hours  clopidogrel Tablet 75 milliGRAM(s) Oral daily  heparin   Injectable 5000 Unit(s) SubCutaneous every 8 hours  lisinopril 10 milliGRAM(s) Oral daily  multivitamin 1 Tablet(s) Oral daily  pantoprazole    Tablet 40 milliGRAM(s) Oral before breakfast      RADIOLOGY & ADDITIONAL TESTS:    < from: Xray Femur 2 Views, Left (10.27.21 @ 16:38) >  Possible soft tissue laceration involving the medial left thigh at the level of the mid femur. Correlation with physical examination is recommended.    No acute displaced fracture dislocation.    Chronic healed left mid femur fracture with postsurgical change.    Vascular calcifications.    Degenerative change        MICROBIOLOGY DATA:    Culture - Other (10.29.21 @ 16:31)   Specimen Source: Wound LEFT LEG WOUND C+S AA   Culture Results: No growth     COVID-19 Gerardo Domain Antibody (10.28.21 @ 06:46)   COVID-19 Gerardo Domain Antibody Result: >250.00:  COVID-19 PCR (10.27.21 @ 15:13)   COVID-19 PCR: NotDetec:           
.  s/p OR washout, debridement, and approximation --POD # 2      Patient seen and examined.  No acute events noted overnight.  Patient feels well and reports ambulated a few steps with physical therapy yesterday.  Denies new complaints.   SACHIN drain patent draining serosanguinous fluid.   Patient denies subjective fever, chills, tremors, N/V/D, CP or SOB.           I&O's Detail    30 Oct 2021 07:01  -  31 Oct 2021 07:00  --------------------------------------------------------  IN:  Total IN: 0 mL    OUT:    Bulb (mL): 20 mL  Total OUT: 20 mL    Total NET: -20 mL      31 Oct 2021 07:01  -  31 Oct 2021 14:17  --------------------------------------------------------  IN:  Total IN: 0 mL    OUT:    Bulb (mL): 10 mL  Total OUT: 10 mL    Total NET: -10 mL        MEDICATIONS  (STANDING):  acetaminophen     Tablet .. 650 milliGRAM(s) Oral every 6 hours  aspirin  chewable 81 milliGRAM(s) Oral daily  atorvastatin 40 milliGRAM(s) Oral at bedtime  ceFAZolin   IVPB 1000 milliGRAM(s) IV Intermittent every 8 hours  clopidogrel Tablet 75 milliGRAM(s) Oral daily  heparin   Injectable 5000 Unit(s) SubCutaneous every 8 hours  lisinopril 10 milliGRAM(s) Oral daily  multivitamin 1 Tablet(s) Oral daily  pantoprazole    Tablet 40 milliGRAM(s) Oral before breakfast    MEDICATIONS  (PRN):  ALBUTerol    90 MICROgram(s) HFA Inhaler 2 Puff(s) Inhalation every 6 hours PRN Shortness of Breath and/or Wheezing  HYDROmorphone  Injectable 0.5 milliGRAM(s) IV Push every 4 hours PRN Severe Pain (7 - 10)  ondansetron Injectable 4 milliGRAM(s) IV Push four times a day PRN Nausea and/or Vomiting  oxyCODONE    IR 5 milliGRAM(s) Oral every 6 hours PRN Moderate Pain (4 - 6)        Vital Signs Last 24 Hrs  T(C): 36.1 (31 Oct 2021 14:08), Max: 36.5 (30 Oct 2021 21:05)  T(F): 97 (31 Oct 2021 14:08), Max: 97.7 (30 Oct 2021 21:05)  HR: 84 (31 Oct 2021 14:08) (72 - 84)  BP: 141/77 (31 Oct 2021 14:08) (141/77 - 168/88)  RR: 16 (31 Oct 2021 14:08) (16 - 16)        Physical Exam:  General:  WD, WN, conversant in NAD.   Lungs:  CTA bilaterally.  Cor:  S1 & S2 RRR.  Abdomen:  + Bowel sounds, soft, no distention.  Non-tender,  no rebound/guarding or peritoneal signs.   Extremity:  Tegaderm dressing clean, dry and intact to left thigh.  No bleeding, discharge or erythema noted.  SACHIN drain in place with serosanguinous discharge noted. Mild tenderness to palpation.  FROM with hip and knee.  Palpable distal pulses B/L.  No calf tenderness B/L, Absent Leodan's sign B/L.              LABS:                        14.5   7.26  )-----------( 224      ( 31 Oct 2021 07:17 )             44.6     10-31    140  |  102  |  15  ----------------------------<  109<H>  5.1<H>   |  26  |  1.0    Ca    8.8      31 Oct 2021 07:17  Phos  3.1     10-30  Mg     1.9     10-30          .
.  s/p OR washout, debridement, and approximation --POD # 3      Patient seen and examined.  No acute events noted overnight.  Patient feels well and reports ambulated a few steps with physical therapy yesterday.  Denies new complaints.   SACHIN drain patent draining serosanguinous fluid.   Patient denies subjective fever, chills, tremors, N/V/D, CP or SOB.     I&O's Detail    31 Oct 2021 07:01  -  01 Nov 2021 07:00  --------------------------------------------------------  IN:  Total IN: 0 mL    OUT:    Bulb (mL): 20 mL  Total OUT: 20 mL    Total NET: -20 mL              MEDICATIONS  (STANDING):  acetaminophen     Tablet .. 650 milliGRAM(s) Oral every 6 hours  aspirin  chewable 81 milliGRAM(s) Oral daily  atorvastatin 40 milliGRAM(s) Oral at bedtime  ceFAZolin   IVPB 1000 milliGRAM(s) IV Intermittent every 8 hours  clopidogrel Tablet 75 milliGRAM(s) Oral daily  heparin   Injectable 5000 Unit(s) SubCutaneous every 8 hours  lisinopril 10 milliGRAM(s) Oral daily  multivitamin 1 Tablet(s) Oral daily  pantoprazole    Tablet 40 milliGRAM(s) Oral before breakfast    MEDICATIONS  (PRN):  ALBUTerol    90 MICROgram(s) HFA Inhaler 2 Puff(s) Inhalation every 6 hours PRN Shortness of Breath and/or Wheezing  HYDROmorphone  Injectable 0.5 milliGRAM(s) IV Push every 4 hours PRN Severe Pain (7 - 10)  ondansetron Injectable 4 milliGRAM(s) IV Push four times a day PRN Nausea and/or Vomiting  oxyCODONE    IR 5 milliGRAM(s) Oral every 6 hours PRN Moderate Pain (4 - 6)      Vital Signs Last 24 Hrs  T(C): 35.8 (01 Nov 2021 05:36), Max: 36.7 (31 Oct 2021 20:56)  T(F): 96.5 (01 Nov 2021 05:36), Max: 98 (31 Oct 2021 20:56)  HR: 80 (01 Nov 2021 05:36) (75 - 84)  BP: 155/98 (01 Nov 2021 05:36) (128/73 - 155/98)  RR: 16 (01 Nov 2021 05:36) (16 - 16)      Physical Exam:  General:  WD, WN, conversant in NAD.   Lungs:  CTA bilaterally.  Cor:  S1 & S2 RRR.  Abdomen:  + Bowel sounds, soft, no distention.  Non-tender,  no rebound/guarding or peritoneal signs.   Extremity:  Dressing removed , staples intact with no oozing ,  FROM with hip and knee.  Palpable distal pulses B/L.  No calf tenderness B/L, Absent Leodan's sign B/L.              LABS:                        14.5   7.26  )-----------( 224      ( 31 Oct 2021 07:17 )             44.6     10-31    140  |  102  |  15  ----------------------------<  109<H>  5.1<H>   |  26  |  1.0    Ca    8.8      31 Oct 2021 07:17  Phos  3.1     10-30  Mg     1.9     10-30          .
DARRYLIVETT  66y, Male  Allergy: No Known Allergies      LOS  2d    CHIEF COMPLAINT: laceration  (29 Oct 2021 10:47)      INTERVAL EVENTS/HPI  - No acute events overnight  - T(F): , Max: 98 (10-28-21 @ 13:51)  - tenderness by laceration site   - WBC Count: 8.49 (10-28-21 @ 06:46)  WBC Count: 8.97 (10-27-21 @ 15:58)     - Creatinine, Serum: 0.9 (10-28-21 @ 06:46)  Creatinine, Serum: 1.2 (10-27-21 @ 15:58)       ROS  General: Denies rigors, nightsweats  HEENT: Denies headache, rhinorrhea, sore throat, eye pain  CV: Denies CP, palpitations  PULM: Denies wheezing, hemoptysis  GI: Denies hematemesis, hematochezia, melena  : Denies discharge, hematuria  MSK: Denies arthralgias, myalgias  SKIN: Denies rash, lesions  NEURO: Denies paresthesias, weakness  PSYCH: Denies depression, anxiety    VITALS:  T(F): 96.7, Max: 98 (10-28-21 @ 13:51)  HR: 86  BP: 126/79  RR: 16Vital Signs Last 24 Hrs  T(C): 35.9 (29 Oct 2021 11:25), Max: 36.7 (28 Oct 2021 13:51)  T(F): 96.7 (29 Oct 2021 11:25), Max: 98 (28 Oct 2021 13:51)  HR: 86 (29 Oct 2021 11:25) (72 - 86)  BP: 126/79 (29 Oct 2021 11:25) (122/59 - 126/79)  BP(mean): --  RR: 16 (29 Oct 2021 11:25) (16 - 18)  SpO2: --    PHYSICAL EXAM:  Gen: NAD, resting in bed  HEENT: Normocephalic, atraumatic  Neck: supple, no lymphadenopathy  CV: Regular rate & regular rhythm  Lungs: decreased BS at bases, no fremitus  Abdomen: Soft, BS present  Ext: Warm, well perfused  Neuro: non focal, awake  Skin: left leg laceration area indurated  Lines: no phlebitis    FH: Non-contributory  Social Hx: Non-contributory    TESTS & MEASUREMENTS:                        13.8   8.49  )-----------( 227      ( 28 Oct 2021 06:46 )             42.1     10-28    139  |  100  |  16  ----------------------------<  114<H>  4.8   |  30  |  0.9    Ca    9.7      28 Oct 2021 06:46    TPro  6.6  /  Alb  4.4  /  TBili  0.4  /  DBili  x   /  AST  14  /  ALT  17  /  AlkPhos  93  10-28      LIVER FUNCTIONS - ( 28 Oct 2021 06:46 )  Alb: 4.4 g/dL / Pro: 6.6 g/dL / ALK PHOS: 93 U/L / ALT: 17 U/L / AST: 14 U/L / GGT: x                     INFECTIOUS DISEASES TESTING  COVID-19 PCR: NotDetec (10-27-21 @ 15:13)      INFLAMMATORY MARKERS      RADIOLOGY & ADDITIONAL TESTS:  I have personally reviewed the last available Chest xray  CXR      CT      CARDIOLOGY TESTING  12 Lead ECG:   Ventricular Rate 86 BPM    Atrial Rate 86 BPM    P-R Interval 164 ms    QRS Duration 106 ms    Q-T Interval 370 ms    QTC Calculation(Bazett) 442 ms    P Axis 45 degrees    R Axis -35 degrees    T Axis 47 degrees    Diagnosis Line Sinus rhythm withPremature atrial complexes  Left axis deviation  Abnormal ECG    Confirmed by STAS DAVIDSON MD ([id number]) on 10/28/2021 12:40:08 PM (10-27-21 @ 16:14)      MEDICATIONS  aspirin  chewable 81 Oral daily  atorvastatin 40 Oral at bedtime  ceFAZolin   IVPB 1000 IV Intermittent every 8 hours  heparin   Injectable 5000 SubCutaneous every 8 hours  lactated ringers. 1000 IV Continuous <Continuous>  lisinopril 10 Oral daily  multivitamin 1 Oral daily  pantoprazole    Tablet 40 Oral before breakfast      WEIGHT  Weight (kg): 96.7 (10-29-21 @ 11:25)      ANTIBIOTICS:  ceFAZolin   IVPB 1000 milliGRAM(s) IV Intermittent every 8 hours      All available historical records have been reviewed      
IVETT ANDREWS  66y, Male  Allergy: No Known Allergies      CHIEF COMPLAINT:     HPI:  67yo male (seems forgetful, obtained several details including medication list and PMH from surgical team) whose PMH includes CVA, HTN and HLD, presents to the ER with left thigh laceration following an accident with a . Denies fevers, chills and currently no pain Dilaudid was ordered for him in the ER). In the ER he did receive IV antibiotics and general surgery preformed a skin closure procedure. Because of muscle (and likely nerve) involvement, orthopedist surgery was called (27 Oct 2021 19:20)    HPI:    FAMILY HISTORY:  Family history unknown      PAST MEDICAL & SURGICAL HISTORY:  Medical history unknown    Surgical history unknown        SOCIAL HISTORY  Social History:  stopped tobacco and alcohol use previously (27 Oct 2021 19:20)        ROS  General: Denies fevers, chills, nightsweats, weight loss  HEENT: Denies headache, rhinorrhea, sore throat, eye pain  CV: Denies CP, palpitations  PULM: Denies SOB, cough  GI: Denies abdominal pain, diarrhea  : Denies dysuria, hematuria  MSK: lef tleg pain around suture site  SKIN: Denies rash   NEURO: Denies paresthesias, weakness  PSYCH: Denies depression    VITALS:  T(F): 98, Max: 98.1 (10-27-21 @ 20:05)  HR: 72  BP: 126/71  RR: 16Vital Signs Last 24 Hrs  T(C): 36.7 (28 Oct 2021 13:51), Max: 36.7 (27 Oct 2021 20:05)  T(F): 98 (28 Oct 2021 13:51), Max: 98.1 (27 Oct 2021 20:05)  HR: 72 (28 Oct 2021 13:51) (68 - 88)  BP: 126/71 (28 Oct 2021 13:51) (126/71 - 168/90)  BP(mean): --  RR: 16 (28 Oct 2021 13:51) (16 - 20)  SpO2: 98% (27 Oct 2021 20:05) (98% - 99%)    PHYSICAL EXAM:  Gen: NAD, resting in bed  HEENT: Normocephalic, atraumatic  Neck: supple, no lymphadenopathy  CV: Regular rate & regular rhythm  Lungs: decreased BS at bases, no fremitus  Abdomen: Soft, BS present  Ext: left thigh dressed, mild pain around site, FROM hip to toes +pulses  sensation grossly intact   Neuro: non focal, awake  Skin: no rash, no erythema  Psych: no SI, HI, Hallucination     TESTS & MEASUREMENTS:                        13.8   8.49  )-----------( 227      ( 28 Oct 2021 06:46 )             42.1     10-28    139  |  100  |  16  ----------------------------<  114<H>  4.8   |  30  |  0.9    Ca    9.7      28 Oct 2021 06:46    TPro  6.6  /  Alb  4.4  /  TBili  0.4  /  DBili  x   /  AST  14  /  ALT  17  /  AlkPhos  93  10-28    eGFR if Non African American: 89 mL/min/1.73M2 (10-28-21 @ 06:46)  eGFR if : 103 mL/min/1.73M2 (10-28-21 @ 06:46)  eGFR if Non African American: 63 mL/min/1.73M2 (10-27-21 @ 15:58)  eGFR if : 73 mL/min/1.73M2 (10-27-21 @ 15:58)    LIVER FUNCTIONS - ( 28 Oct 2021 06:46 )  Alb: 4.4 g/dL / Pro: 6.6 g/dL / ALK PHOS: 93 U/L / ALT: 17 U/L / AST: 14 U/L / GGT: x                   QRS axis to [] ° and NSR at a rate of [] BPM. There was no atrial enlargement. There was no ventricular hypertrophy. There were no ST-T changes and all intervals were normal.      INFECTIOUS DISEASES TESTING      RADIOLOGY & ADDITIONAL TESTS:  I have personally reviewed the last Chest xray  CXR      CT      CARDIOLOGY TESTING  12 Lead ECG:   Ventricular Rate 86 BPM    Atrial Rate 86 BPM    P-R Interval 164 ms    QRS Duration 106 ms    Q-T Interval 370 ms    QTC Calculation(Bazett) 442 ms    P Axis 45 degrees    R Axis -35 degrees    T Axis 47 degrees    Diagnosis Line Sinus rhythm withPremature atrial complexes  Left axis deviation  Abnormal ECG    Confirmed by STAS DAVIDSON MD ([id number]) on 10/28/2021 12:40:08 PM (10-27-21 @ 16:14)      MEDICATIONS  atorvastatin 40  ceFAZolin   IVPB 1000  heparin   Injectable 5000  lisinopril 10  multivitamin 1  pantoprazole    Tablet 40      ANTIBIOTICS:  ceFAZolin   IVPB 1000 milliGRAM(s) IV Intermittent every 8 hours      All available historical data has been reviewed    ASSESSMENT  66y M admitted with LACERATION OF LEG        PROBLEMS  
Progress Note: General Surgery  Patient: IVETT ANDREWS , 66y (1955)Male   MRN: [id number]  Location: [place] [address]  Visit: 10-27-21 Inpatient  Date: 10-29-21 @ 07:46    Admit Diagnosis/Chief Complaint:   Large laceration to Left thigh from /saw  Was washed out and explored in ED. Closed skin.  Orthopedics saw the patient, and there is no need for orthopedic intervention at this time.     Vitals: T(F): 97.9 (10-29-21 @ 05:00), Max: 98 (10-28-21 @ 13:51)  HR: 86 (10-29-21 @ 05:00)  BP: 126/79 (10-29-21 @ 05:00) (122/59 - 126/79)  RR: 16 (10-29-21 @ 05:00)  SpO2: --    In:   Out:   Net:     Diet: Diet, NPO after Midnight:      NPO Start Date: 28-Oct-2021,   NPO Start Time: 23:59 (10-28-21 @ 19:39)  Diet, Regular (10-27-21 @ 19:25)    IV Fluids: multivitamin 1 Tablet(s) Oral daily      Physical Examination:  General Appearance: NAD   HEENT: EOMI, sclera non-icteric.  Heart: RRR   Lungs: CTABL.   Abdomen:  Soft, nontender, nondistended.   MSK/Extremities: Warm & well-perfused. No active bleeding from laceration. No sign of infection  Skin: Warm, dry. No jaundice.       Medications: [Standing]  aspirin  chewable 81 milliGRAM(s) Oral daily  atorvastatin 40 milliGRAM(s) Oral at bedtime  ceFAZolin   IVPB 1000 milliGRAM(s) IV Intermittent every 8 hours  heparin   Injectable 5000 Unit(s) SubCutaneous every 8 hours  lisinopril 10 milliGRAM(s) Oral daily  multivitamin 1 Tablet(s) Oral daily  pantoprazole    Tablet 40 milliGRAM(s) Oral before breakfast    DVT Prophylaxis: heparin   Injectable 5000 Unit(s) SubCutaneous every 8 hours    GI Prophylaxis: pantoprazole    Tablet 40 milliGRAM(s) Oral before breakfast    Antibiotics: ceFAZolin   IVPB 1000 milliGRAM(s) IV Intermittent every 8 hours    Anticoagulation:   Medications:[PRN]  ALBUTerol    90 MICROgram(s) HFA Inhaler 2 Puff(s) Inhalation every 6 hours PRN  ondansetron Injectable 4 milliGRAM(s) IV Push four times a day PRN  oxycodone    5 mG/acetaminophen 325 mG 1 Tablet(s) Oral every 4 hours PRN      Labs:                        13.8   8.49  )-----------( 227      ( 28 Oct 2021 06:46 )             42.1     10-28    139  |  100  |  16  ----------------------------<  114<H>  4.8   |  30  |  0.9    Ca    9.7      28 Oct 2021 06:46    TPro  6.6  /  Alb  4.4  /  TBili  0.4  /  DBili  x   /  AST  14  /  ALT  17  /  AlkPhos  93  10-28    LIVER FUNCTIONS - ( 28 Oct 2021 06:46 )  Alb: 4.4 g/dL / Pro: 6.6 g/dL / ALK PHOS: 93 U/L / ALT: 17 U/L / AST: 14 U/L / GGT: x           PT/INR - ( 27 Oct 2021 15:58 )   PT: 11.80 sec;   INR: 1.03 ratio         PTT - ( 27 Oct 2021 15:58 )  PTT:28.3 sec    < from: Xray Femur 2 Views, Left (10.27.21 @ 16:38) >  impression:    Possible soft tissue laceration involving the medial left thigh at the level of the mid femur. Correlation with physical examination is recommended.    No acute displaced fracture dislocation.    Chronic healed left mid femur fracture with postsurgical change.    Vascular calcifications.    Degenerative change    --- End of Report ---    < end of copied text >          
Progress Note: General Surgery  Patient: IVETT ANDREWS , 66y (1955)Male   MRN: [id number]  Location: [place] [address]  Visit: 10-27-21 Inpatient  Date: 10-30-21 @ 09:48    Admit Diagnosis/Chief Complaint: Wound of left lower extremity    Procedure/Diagnosis: Wound of left lower extremity     S/P Exploration, wound, penetrating, extremity     POD# 1    Events/ 24h: No acute events overnight. Pain controlled. SACHIN drain in place. Serosanguinous. Hb stable. Patient complains of some soreness, but feels well overall. Neurology saw patient, consitnue ASA and Plavix, and he can f/u as an outpatient.     Vitals: T(F): 97.8 (10-30-21 @ 04:22), Max: 98.9 (10-29-21 @ 18:00)  HR: 64 (10-30-21 @ 04:22)  BP: 132/67 (10-30-21 @ 04:22) (114/55 - 171/80)  RR: 16 (10-30-21 @ 04:22)  SpO2: 97% (10-29-21 @ 22:16)    In:   10-29-21 @ 07:01  -  10-30-21 @ 07:00  --------------------------------------------------------  IN: 75 mL      Out:   10-29-21 @ 07:01  -  10-30-21 @ 07:00  --------------------------------------------------------  OUT:    Bulb (mL): 30 mL  Total OUT: 30 mL        Net:   10-29-21 @ 07:01  -  10-30-21 @ 07:00  --------------------------------------------------------  NET: 45 mL        Diet: Diet, Regular (10-27-21 @ 19:25)    IV Fluids: multivitamin 1 Tablet(s) Oral daily      Physical Examination:  General Appearance: NAD   HEENT: EOMI, sclera non-icteric.  Heart: RRR   Lungs: CTABL.   Abdomen:  Soft, nontender, nondistended.   MSK/Extremities: Warm & well-perfused. LLE dressing clean dry and intact. SACHIN drain serosanguineous   Skin: Warm, dry. No jaundice.       Medications: [Standing]  acetaminophen     Tablet .. 650 milliGRAM(s) Oral every 6 hours  aspirin  chewable 81 milliGRAM(s) Oral daily  atorvastatin 40 milliGRAM(s) Oral at bedtime  ceFAZolin   IVPB 1000 milliGRAM(s) IV Intermittent every 8 hours  clopidogrel Tablet 75 milliGRAM(s) Oral daily  heparin   Injectable 5000 Unit(s) SubCutaneous every 8 hours  lisinopril 10 milliGRAM(s) Oral daily  multivitamin 1 Tablet(s) Oral daily  pantoprazole    Tablet 40 milliGRAM(s) Oral before breakfast    DVT Prophylaxis: heparin   Injectable 5000 Unit(s) SubCutaneous every 8 hours    GI Prophylaxis: pantoprazole    Tablet 40 milliGRAM(s) Oral before breakfast    Antibiotics: ceFAZolin   IVPB 1000 milliGRAM(s) IV Intermittent every 8 hours    Anticoagulation:   Medications:[PRN]  ALBUTerol    90 MICROgram(s) HFA Inhaler 2 Puff(s) Inhalation every 6 hours PRN  HYDROmorphone  Injectable 0.5 milliGRAM(s) IV Push every 4 hours PRN  ondansetron Injectable 4 milliGRAM(s) IV Push four times a day PRN  oxyCODONE    IR 5 milliGRAM(s) Oral every 6 hours PRN      Labs:                        14.5   8.16  )-----------( 245      ( 30 Oct 2021 08:05 )             44.2     10-30    140  |  101  |  16  ----------------------------<  109<H>  4.7   |  28  |  1.0    Ca    8.7      30 Oct 2021 08:05  Phos  3.1     10-30  Mg     1.9     10-30

## 2021-11-05 LAB — SURGICAL PATHOLOGY STUDY: SIGNIFICANT CHANGE UP

## 2021-11-06 DIAGNOSIS — S76.922A: ICD-10-CM
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
## 2021-11-06 DIAGNOSIS — Z79.02 LONG TERM (CURRENT) USE OF ANTITHROMBOTICS/ANTIPLATELETS: ICD-10-CM

## 2021-11-06 DIAGNOSIS — J43.9 EMPHYSEMA, UNSPECIFIED: ICD-10-CM

## 2021-11-06 DIAGNOSIS — Z79.82 LONG TERM (CURRENT) USE OF ASPIRIN: ICD-10-CM
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
## 2021-11-06 DIAGNOSIS — Z86.73 PERSONAL HISTORY OF TRANSIENT ISCHEMIC ATTACK (TIA), AND CEREBRAL INFARCTION WITHOUT RESIDUAL DEFICITS: ICD-10-CM

## 2021-11-06 DIAGNOSIS — E78.5 HYPERLIPIDEMIA, UNSPECIFIED: ICD-10-CM
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
## 2021-11-06 DIAGNOSIS — I10 ESSENTIAL (PRIMARY) HYPERTENSION: ICD-10-CM

## 2022-06-09 ENCOUNTER — OUTPATIENT (OUTPATIENT)
Dept: OUTPATIENT SERVICES | Facility: HOSPITAL | Age: 67
LOS: 1 days | End: 2022-06-09
Payer: MEDICAID

## 2022-06-09 VITALS
DIASTOLIC BLOOD PRESSURE: 79 MMHG | WEIGHT: 207.01 LBS | HEIGHT: 69 IN | OXYGEN SATURATION: 96 % | HEART RATE: 79 BPM | TEMPERATURE: 98 F | RESPIRATION RATE: 18 BRPM | SYSTOLIC BLOOD PRESSURE: 124 MMHG

## 2022-06-09 DIAGNOSIS — R31.0 GROSS HEMATURIA: ICD-10-CM

## 2022-06-09 DIAGNOSIS — D49.4 NEOPLASM OF UNSPECIFIED BEHAVIOR OF BLADDER: ICD-10-CM

## 2022-06-09 DIAGNOSIS — N13.30 UNSPECIFIED HYDRONEPHROSIS: ICD-10-CM

## 2022-06-09 DIAGNOSIS — Z78.9 OTHER SPECIFIED HEALTH STATUS: Chronic | ICD-10-CM

## 2022-06-09 DIAGNOSIS — Z87.828 PERSONAL HISTORY OF OTHER (HEALED) PHYSICAL INJURY AND TRAUMA: Chronic | ICD-10-CM

## 2022-06-09 DIAGNOSIS — Z01.818 ENCOUNTER FOR OTHER PREPROCEDURAL EXAMINATION: ICD-10-CM

## 2022-06-09 DIAGNOSIS — Z86.73 PERSONAL HISTORY OF TRANSIENT ISCHEMIC ATTACK (TIA), AND CEREBRAL INFARCTION WITHOUT RESIDUAL DEFICITS: ICD-10-CM

## 2022-06-09 DIAGNOSIS — Z98.890 OTHER SPECIFIED POSTPROCEDURAL STATES: Chronic | ICD-10-CM

## 2022-06-09 LAB
ANION GAP SERPL CALC-SCNC: 10 MMOL/L — SIGNIFICANT CHANGE UP (ref 5–17)
BUN SERPL-MCNC: 15 MG/DL — SIGNIFICANT CHANGE UP (ref 7–23)
CALCIUM SERPL-MCNC: 9.5 MG/DL — SIGNIFICANT CHANGE UP (ref 8.4–10.5)
CHLORIDE SERPL-SCNC: 101 MMOL/L — SIGNIFICANT CHANGE UP (ref 96–108)
CO2 SERPL-SCNC: 27 MMOL/L — SIGNIFICANT CHANGE UP (ref 22–31)
CREAT SERPL-MCNC: 1 MG/DL — SIGNIFICANT CHANGE UP (ref 0.5–1.3)
EGFR: 82 ML/MIN/1.73M2 — SIGNIFICANT CHANGE UP
GLUCOSE SERPL-MCNC: 89 MG/DL — SIGNIFICANT CHANGE UP (ref 70–99)
HCT VFR BLD CALC: 46.2 % — SIGNIFICANT CHANGE UP (ref 39–50)
HGB BLD-MCNC: 15.2 G/DL — SIGNIFICANT CHANGE UP (ref 13–17)
MCHC RBC-ENTMCNC: 31.2 PG — SIGNIFICANT CHANGE UP (ref 27–34)
MCHC RBC-ENTMCNC: 32.9 GM/DL — SIGNIFICANT CHANGE UP (ref 32–36)
MCV RBC AUTO: 94.9 FL — SIGNIFICANT CHANGE UP (ref 80–100)
NRBC # BLD: 0 /100 WBCS — SIGNIFICANT CHANGE UP (ref 0–0)
PLATELET # BLD AUTO: 269 K/UL — SIGNIFICANT CHANGE UP (ref 150–400)
POTASSIUM SERPL-MCNC: 4.3 MMOL/L — SIGNIFICANT CHANGE UP (ref 3.5–5.3)
POTASSIUM SERPL-SCNC: 4.3 MMOL/L — SIGNIFICANT CHANGE UP (ref 3.5–5.3)
RBC # BLD: 4.87 M/UL — SIGNIFICANT CHANGE UP (ref 4.2–5.8)
RBC # FLD: 14.4 % — SIGNIFICANT CHANGE UP (ref 10.3–14.5)
SODIUM SERPL-SCNC: 138 MMOL/L — SIGNIFICANT CHANGE UP (ref 135–145)
WBC # BLD: 6.87 K/UL — SIGNIFICANT CHANGE UP (ref 3.8–10.5)
WBC # FLD AUTO: 6.87 K/UL — SIGNIFICANT CHANGE UP (ref 3.8–10.5)

## 2022-06-09 PROCEDURE — 80048 BASIC METABOLIC PNL TOTAL CA: CPT

## 2022-06-09 PROCEDURE — G0463: CPT

## 2022-06-09 PROCEDURE — 87086 URINE CULTURE/COLONY COUNT: CPT

## 2022-06-09 PROCEDURE — 85027 COMPLETE CBC AUTOMATED: CPT

## 2022-06-09 RX ORDER — CEFAZOLIN SODIUM 1 G
2000 VIAL (EA) INJECTION ONCE
Refills: 0 | Status: DISCONTINUED | OUTPATIENT
Start: 2022-06-23 | End: 2022-07-07

## 2022-06-09 NOTE — H&P PST ADULT - PROBLEM SELECTOR PLAN 1
Transurethral Resection Of Bladder Tumor ,Cystourethroscopy W/.Ureteroscopy and Pyleoscopy  Insertion of Indwelling  Stent on 6/23/22.   Pre -Op Instructions discussed   Labs sent   urine culture sent  covid test 6/20/22

## 2022-06-09 NOTE — H&P PST ADULT - HISTORY OF PRESENT ILLNESS
68 yo male poor historian with PMH includes CVA no residual , HTN ,HLD,GERD . Pt was seen by PMD for hematuria followed urology dx neoplasm of bladder .Presents to PST for scheduled   66 yo male poor historian with PMH includes CVA no residual on plavix and aspirin  , HTN ,HLD,GERD . Pt was seen by PMD for hematuria followed urology dx neoplasm of bladder . Presents to PST for scheduled  Transurethral Resection Of Bladder Tumor ,Cystourethroscopy W/.Ureteroscopy and Pyleoscopy  Insertion of Indwelling  Stent on 6/23/22.    advised to stop plavix 7 days and continue aspirin - medical eval  Covid test 6/20/22

## 2022-06-09 NOTE — H&P PST ADULT - NSICDXPASTMEDICALHX_GEN_ALL_CORE_FT
PAST MEDICAL HISTORY:  H/O: CVA (cerebrovascular accident)     HLD (hyperlipidemia)     HTN (hypertension)     Medical history unknown      PAST MEDICAL HISTORY:  Gross hematuria     H/O: CVA (cerebrovascular accident)     HLD (hyperlipidemia)     HTN (hypertension)

## 2022-06-09 NOTE — H&P PST ADULT - NSICDXPASTSURGICALHX_GEN_ALL_CORE_FT
PAST SURGICAL HISTORY:  History of open leg wound 11.21    Surgical history unknown      PAST SURGICAL HISTORY:  History of open leg wound 11.21    History of surgery on right wrist long time ago

## 2022-06-10 LAB
CULTURE RESULTS: SIGNIFICANT CHANGE UP
SPECIMEN SOURCE: SIGNIFICANT CHANGE UP

## 2022-06-20 ENCOUNTER — OUTPATIENT (OUTPATIENT)
Dept: OUTPATIENT SERVICES | Facility: HOSPITAL | Age: 67
LOS: 1 days | End: 2022-06-20
Payer: MEDICAID

## 2022-06-20 DIAGNOSIS — Z98.890 OTHER SPECIFIED POSTPROCEDURAL STATES: Chronic | ICD-10-CM

## 2022-06-20 DIAGNOSIS — Z87.828 PERSONAL HISTORY OF OTHER (HEALED) PHYSICAL INJURY AND TRAUMA: Chronic | ICD-10-CM

## 2022-06-20 DIAGNOSIS — Z11.52 ENCOUNTER FOR SCREENING FOR COVID-19: ICD-10-CM

## 2022-06-20 PROBLEM — E78.5 HYPERLIPIDEMIA, UNSPECIFIED: Chronic | Status: ACTIVE | Noted: 2022-06-09

## 2022-06-20 PROBLEM — I10 ESSENTIAL (PRIMARY) HYPERTENSION: Chronic | Status: ACTIVE | Noted: 2022-06-09

## 2022-06-20 PROBLEM — Z86.73 PERSONAL HISTORY OF TRANSIENT ISCHEMIC ATTACK (TIA), AND CEREBRAL INFARCTION WITHOUT RESIDUAL DEFICITS: Chronic | Status: ACTIVE | Noted: 2022-06-09

## 2022-06-20 PROBLEM — R31.0 GROSS HEMATURIA: Chronic | Status: ACTIVE | Noted: 2022-06-09

## 2022-06-20 LAB — SARS-COV-2 RNA SPEC QL NAA+PROBE: SIGNIFICANT CHANGE UP

## 2022-06-20 PROCEDURE — C9803: CPT

## 2022-06-20 PROCEDURE — U0005: CPT

## 2022-06-20 PROCEDURE — U0003: CPT

## 2022-06-23 ENCOUNTER — OUTPATIENT (OUTPATIENT)
Dept: OUTPATIENT SERVICES | Facility: HOSPITAL | Age: 67
LOS: 1 days | End: 2022-06-23
Payer: MEDICAID

## 2022-06-23 VITALS
HEART RATE: 90 BPM | SYSTOLIC BLOOD PRESSURE: 125 MMHG | OXYGEN SATURATION: 96 % | DIASTOLIC BLOOD PRESSURE: 71 MMHG | RESPIRATION RATE: 16 BRPM

## 2022-06-23 VITALS
OXYGEN SATURATION: 96 % | WEIGHT: 207.01 LBS | SYSTOLIC BLOOD PRESSURE: 119 MMHG | HEIGHT: 69 IN | RESPIRATION RATE: 18 BRPM | HEART RATE: 79 BPM | TEMPERATURE: 99 F | DIASTOLIC BLOOD PRESSURE: 75 MMHG

## 2022-06-23 DIAGNOSIS — Z98.890 OTHER SPECIFIED POSTPROCEDURAL STATES: Chronic | ICD-10-CM

## 2022-06-23 DIAGNOSIS — R31.0 GROSS HEMATURIA: ICD-10-CM

## 2022-06-23 DIAGNOSIS — N13.30 UNSPECIFIED HYDRONEPHROSIS: ICD-10-CM

## 2022-06-23 DIAGNOSIS — D49.4 NEOPLASM OF UNSPECIFIED BEHAVIOR OF BLADDER: ICD-10-CM

## 2022-06-23 DIAGNOSIS — Z87.828 PERSONAL HISTORY OF OTHER (HEALED) PHYSICAL INJURY AND TRAUMA: Chronic | ICD-10-CM

## 2022-06-23 PROCEDURE — 88307 TISSUE EXAM BY PATHOLOGIST: CPT | Mod: 26

## 2022-06-23 PROCEDURE — 88307 TISSUE EXAM BY PATHOLOGIST: CPT

## 2022-06-23 PROCEDURE — 52240 CYSTOSCOPY AND TREATMENT: CPT

## 2022-06-23 PROCEDURE — 88305 TISSUE EXAM BY PATHOLOGIST: CPT | Mod: 26

## 2022-06-23 PROCEDURE — 88305 TISSUE EXAM BY PATHOLOGIST: CPT

## 2022-06-23 RX ORDER — LIDOCAINE HCL 20 MG/ML
0.2 VIAL (ML) INJECTION ONCE
Refills: 0 | Status: DISCONTINUED | OUTPATIENT
Start: 2022-06-23 | End: 2022-06-23

## 2022-06-23 RX ORDER — HYDROMORPHONE HYDROCHLORIDE 2 MG/ML
0.5 INJECTION INTRAMUSCULAR; INTRAVENOUS; SUBCUTANEOUS
Refills: 0 | Status: DISCONTINUED | OUTPATIENT
Start: 2022-06-23 | End: 2022-06-23

## 2022-06-23 RX ORDER — ONDANSETRON 8 MG/1
4 TABLET, FILM COATED ORAL ONCE
Refills: 0 | Status: DISCONTINUED | OUTPATIENT
Start: 2022-06-23 | End: 2022-06-23

## 2022-06-23 RX ORDER — MITOMYCIN 5 MG/10ML
40 INJECTION, POWDER, LYOPHILIZED, FOR SOLUTION INTRAVENOUS ONCE
Refills: 0 | Status: DISCONTINUED | OUTPATIENT
Start: 2022-06-23 | End: 2022-07-07

## 2022-06-23 RX ORDER — PANTOPRAZOLE SODIUM 20 MG/1
1 TABLET, DELAYED RELEASE ORAL
Qty: 0 | Refills: 0 | DISCHARGE

## 2022-06-23 RX ORDER — MOXIFLOXACIN HYDROCHLORIDE TABLETS, 400 MG 400 MG/1
1 TABLET, FILM COATED ORAL
Qty: 0 | Refills: 0 | DISCHARGE

## 2022-06-23 RX ORDER — SODIUM CHLORIDE 9 MG/ML
3 INJECTION INTRAMUSCULAR; INTRAVENOUS; SUBCUTANEOUS EVERY 8 HOURS
Refills: 0 | Status: DISCONTINUED | OUTPATIENT
Start: 2022-06-23 | End: 2022-06-23

## 2022-06-23 RX ORDER — PHENAZOPYRIDINE HCL 100 MG
2 TABLET ORAL
Qty: 0 | Refills: 0 | DISCHARGE

## 2022-06-23 NOTE — ASU PATIENT PROFILE, ADULT - NSICDXPASTSURGICALHX_GEN_ALL_CORE_FT
PAST SURGICAL HISTORY:  History of open leg wound 11.21    History of surgery on right wrist long time ago

## 2022-06-23 NOTE — PACU DISCHARGE NOTE - HYDRATION STATUS:
MICU Transfer Note    Transfer from: MICU    Transfer to: ( x ) Medicine    (  ) Telemetry     (   ) RCU        (    ) Palliative         (   ) Stroke Unit          (   ) __________________    Accepting Physician:  Signout given to:     MICU COURSE:          ASSESSMENT & PLAN:      83M with PMHx of CAD s/p CABG, mixed systolic and diastolic HF (EF 35-40% 2/19), Paroxysmal Afib on rivaroxaban s/p PPM, HTN, HLD, CKD (Cr 1.4-1.7), mod-severe esophagitis, gastric ulcers, left eye blindness BIBEMS from home for confusion w/ hallucinations x 1 day, +epigastric pain and hypothermia. Found to be septic and anemic to 6.5 in ED, melena reported by ED staff, admitted w/ c/f UGIB. INR 4.0 s/p reversal w/ K centra. MICU consulted for hypotension 89/65 hemorrhagic vs septic shock requiring IV pressors.     Neuro:  #Advanced dementia, A& O x 2, no focal weakness  -fully dependent at home, daughter is taking care of the pt, lives with daughter   - restarted home meds: Donepezil 5 daily and trazodone daily PRN for agitation   -CTH neg 4/4    CV:   #shock due to vasoplegia from sepsis vs GIB vs cardiogenic from HF  - Resolved  -has been off of levophed since midnight   -midodrine 10 started 4/6 was increased to 30 today q 8 yesterday  -TTE -- 4/4- EF 23 %.  Mild MR, Normal left ventricular internal dimensions and wall thicknesses.  Severe global LV dysfx, Right ventricular enlargement with decreased right ventricular systolic function. Moderate pulmonary hypertension.  - 4/5- s/p interrogation PPM-underlying rhythm atrial tachycardia, - intrinsic ventricular rhythm- 50, no ectopy noted on interrogation    #AFib  -currently being Vpaced  -holding A/C rivaroxaban since admission for possible GIB  -started heparin SQ today as H/H stable     Resp:  #possible PNA  -CTAP 4/4 showed "Patchy peribronchovascular airspace opacities at the lung bases concerning for multifocal pneumonia."  -SpO2 100% on RA; no respiratory distress/Sx  -cont empiric Abx w/ Zosyn as below    GI  #anemia on admission with ? melena - R/O Upper GI bleeding   -hx of severe esophagitis and gastric ulcers noted on endoscopy 2014   -reported melena in ER- no signs of bleeding since   -s/p  4 U PRBC, and 1 U plts, 1 U plasma since admission ( last PRBC 4/5)  -on protonix 40 BID I  -seen by GI, no plan for EGD as no signs bleeding and CBC stable  -H/H has been stable 8.2> 7.7>7.9>7.7  ; no further episodes of melena/bleeding noted    #pancreatitis , clinically asymptomatic  - lipase--> >3K >2738> 696 ; downtrended  -CT A/P 4/4: IMPRESSION: Mild diffuse edematous enlargement of the pancreas with peripancreatic   inflammatory change and peripancreatic fluid extending along the paracolic gutters and into the pelvis likely reflective of an acute interstitial pancreatitis. Correlate with laboratory exam. Cholelithiasis/gallbladder sludge in an under distended gallbladder. Patchy peribronchovascular airspace opacities at the lung bases concerning for multifocal pneumonia.    #Diet  -pureed with mod thick liquid as per S/S eval- tolerating well     ID  #sepsis/hypothermia in setting of possible PNA and pancreatitis   -CTAP -- Mild diffuse edematous enlargement of the pancreas with peripancreatic inflammatory change and peripancreatic fluid extending along the paracolic gutters and into the pelvis likely reflective of an acute interstitial pancreatitis. Patchy peribronchovascular airspace opacities at the lung bases concerning for multifocal pneumonia.  -on presentation was hypothermic to 94 now 96-98; t WBC trending down overall 18-> 12->14->12  -cortisol level 26.5; off levophed and remains on midodrine [id number]  - blood cxs -- 4/4 -- negative  - U/cx- negative, urine legionella neg  - MRSA- neg  -s/p vanco 4/3- 4/4  -on zosyn ( 4/3- 4/10) 7 day course for possible asp PNA      Renal  #FAY on CKD likely due to ATN   - Creat- 2.5>3.4>3.11> 2.61>2.31;  improving   -s/p Lasix 20 mg IVP and additional 40 mg IVP given 4/5  -s/p Bumex 2 mg followed by Zaroxolyn 5 mg PO - 4/5  -renal following- not a good HD candidate  -701 in 24hrs/ -2.7 L for LOS  -now autodiuresing;  will hold off on further diuretics     Hematology  #? UGIB given low H/H and reported melena in ER- no reported GIB since; of note on rivaroxaban at home for Afib   -Hb was 9.4 december 2021, then 6.4 on this admission  -s/p 4 U PRBC's, 1 U plts, 1 U plasma since admission (last PRB 4/5)   -H/H now stable in the 7s; ( 8.2> 7.7>7.9>7.7)  -continue monitoring H/H q 12 hrs  -holding A/C in setting of possible GIB  -patient unable to tolerate SCD in setting of leg wounds  -DVT PPX: restarted heparin SQ 5000 Q8hrs today as H/H stable     Extremities  #Bilateral lower legs with multiple small scattered venous ulcers exposing pink-moist dermis without cellulitis  -recs as per wound care     #L arm swelling/ hematoma  -Duplex ordered r/o RVT     Endo:   -previously hypoglycemic when pt was NPO- now resolved  w/  FS in low 100s  -on puree with mild thick liquids diet    GOC: DNR/DNI  - discussed with daughter Kiara and updated her on the pt's status, all questions answered   - pt lives with daughter and completely dependent on her care     FOR FOLLOW UP:  [ ] monitor H/H Q12hrs  [ ] LUE Duplex  [ ] monitor BP MICU Transfer Note    Transfer from: MICU    Transfer to: ( x ) Medicine    (  ) Telemetry     (   ) RCU        (    ) Palliative         (   ) Stroke Unit          (   ) __________________    Accepting Physician:  Signout given to:     MICU COURSE:    83M with PMHx of CAD s/p CABG, mixed systolic and diastolic HF (EF 35-40% 2/19), Paroxysmal Afib on rivaroxaban s/p PPM, HTN, HLD, CKD (Cr 1.4-1.7), mod-severe esophagitis, gastric ulcers (on endoscopy 2014), left eye blindness BIBEMS from home for confusion w/ hallucinations x 1 day, +epigastric pain and hypothermia. Found to be septic and anemic to 6.5 in ED, episode of melena was reported by ED staff and hewas admitted due to concern for UGIB. INR was 4.0 , he is s/p reversal w/ K centra. MICU was consulted in AM for hypotension to 89/65, hypothermia, likely UGIB s/p 3UPRBC ,500 cc NS bolus. He was started on levophed and also started on IV ABx for possible concomitant septic shock. Sepsis workup was ordered.                 ASSESSMENT & PLAN:      83M with PMHx of CAD s/p CABG, mixed systolic and diastolic HF (EF 35-40% 2/19), Paroxysmal Afib on rivaroxaban s/p PPM, HTN, HLD, CKD (Cr 1.4-1.7), mod-severe esophagitis, gastric ulcers, left eye blindness BIBEMS from home for confusion w/ hallucinations x 1 day, +epigastric pain and hypothermia. Found to be septic and anemic to 6.5 in ED, episode of melena was reported by ED staff, and he was admitted for suspected UGIB. INR was 4.0 s/p reversal w/ K centra. MICU consulted for hypotension 89/65 due to hemorrhagic vs septic shock requiring IV pressors.     Neuro:  #Advanced dementia, A& O x 2, no focal weakness  -fully dependent at home, daughter is taking care of the pt, lives with daughter   - restarted home meds: Donepezil 5 daily and trazodone daily PRN for agitation   -CTH neg 4/4    CV:   #shock due to vasoplegia from sepsis vs GIB vs cardiogenic from HF  - Resolved  -has been off of levophed since midnight   -midodrine 10 started 4/6 was increased to 30 today q 8 yesterday  -TTE -- 4/4- EF 23 %.  Mild MR, Normal left ventricular internal dimensions and wall thicknesses.  Severe global LV dysfx, Right ventricular enlargement with decreased right ventricular systolic function. Moderate pulmonary hypertension.  - 4/5- s/p interrogation PPM-underlying rhythm atrial tachycardia, - intrinsic ventricular rhythm- 50, no ectopy noted on interrogation    #AFib  -currently being Vpaced  -holding A/C rivaroxaban since admission for possible GIB  -started heparin SQ today as H/H stable     Resp:  #possible PNA  -CTAP 4/4 showed "Patchy peribronchovascular airspace opacities at the lung bases concerning for multifocal pneumonia."  -SpO2 100% on RA; no respiratory distress/Sx  -cont empiric Abx w/ Zosyn as below    GI  #anemia on admission with ? melena - R/O Upper GI bleeding   -hx of severe esophagitis and gastric ulcers noted on endoscopy 2014   -reported melena in ER- no signs of bleeding since   -s/p  4 U PRBC, and 1 U plts, 1 U plasma since admission ( last PRBC 4/5)  -on protonix 40 BID I  -seen by GI, no plan for EGD as no signs bleeding and CBC stable  -H/H has been stable 8.2> 7.7>7.9>7.7  ; no further episodes of melena/bleeding noted    #pancreatitis , clinically asymptomatic  - lipase--> >3K >2738> 696 ; downtrended  -CT A/P 4/4: IMPRESSION: Mild diffuse edematous enlargement of the pancreas with peripancreatic   inflammatory change and peripancreatic fluid extending along the paracolic gutters and into the pelvis likely reflective of an acute interstitial pancreatitis. Correlate with laboratory exam. Cholelithiasis/gallbladder sludge in an under distended gallbladder. Patchy peribronchovascular airspace opacities at the lung bases concerning for multifocal pneumonia.    #Diet  -pureed with mod thick liquid as per S/S eval- tolerating well     ID  #sepsis/hypothermia in setting of possible PNA and pancreatitis   -CTAP -- Mild diffuse edematous enlargement of the pancreas with peripancreatic inflammatory change and peripancreatic fluid extending along the paracolic gutters and into the pelvis likely reflective of an acute interstitial pancreatitis. Patchy peribronchovascular airspace opacities at the lung bases concerning for multifocal pneumonia.  -on presentation was hypothermic to 94 now 96-98; t WBC trending down overall 18-> 12->14->12  -cortisol level 26.5; off levophed and remains on midodrine [id number]  - blood cxs -- 4/4 -- negative  - U/cx- negative, urine legionella neg  - MRSA- neg  -s/p vanco 4/3- 4/4  -on zosyn ( 4/3- 4/10) 7 day course for possible asp PNA      Renal  #FAY on CKD likely due to ATN   - Creat- 2.5>3.4>3.11> 2.61>2.31;  improving   -s/p Lasix 20 mg IVP and additional 40 mg IVP given 4/5  -s/p Bumex 2 mg followed by Zaroxolyn 5 mg PO - 4/5  -renal following- not a good HD candidate  -701 in 24hrs/ -2.7 L for LOS  -now autodiuresing;  will hold off on further diuretics     Hematology  #? UGIB given low H/H and reported melena in ER- no reported GIB since; of note on rivaroxaban at home for Afib   -Hb was 9.4 december 2021, then 6.4 on this admission  -s/p 4 U PRBC's, 1 U plts, 1 U plasma since admission (last PRB 4/5)   -H/H now stable in the 7s; ( 8.2> 7.7>7.9>7.7)  -continue monitoring H/H q 12 hrs  -holding A/C in setting of possible GIB  -patient unable to tolerate SCD in setting of leg wounds  -DVT PPX: restarted heparin SQ 5000 Q8hrs today as H/H stable     Extremities  #Bilateral lower legs with multiple small scattered venous ulcers exposing pink-moist dermis without cellulitis  -recs as per wound care     #L arm swelling/ hematoma  -Duplex ordered r/o RVT     Endo:   -previously hypoglycemic when pt was NPO- now resolved  w/  FS in low 100s  -on puree with mild thick liquids diet    GOC: DNR/DNI  - discussed with daughter Kiara and updated her on the pt's status, all questions answered   - pt lives with daughter and completely dependent on her care     FOR FOLLOW UP:  [ ] monitor H/H Q12hrs  [ ] LUE Duplex  [ ] monitor BP MICU Transfer Note    Transfer from: MICU    Transfer to: ( x ) Medicine    (  ) Telemetry     (   ) RCU        (    ) Palliative         (   ) Stroke Unit          (   ) __________________    Accepting Physician:  Signout given to:     MICU COURSE:    83M with PMHx of CAD s/p CABG, mixed systolic and diastolic HF (EF 35-40% 2/19), Paroxysmal Afib on rivaroxaban s/p PPM, HTN, HLD, CKD (Cr 1.4-1.7), mod-severe esophagitis, gastric ulcers (on endoscopy 2014), left eye blindness BIBEMS from home for confusion w/ hallucinations x 1 day, +epigastric pain and hypothermia. Found to be septic and anemic to 6.5 in ED, episode of melena was reported by ED staff and hewas admitted due to concern for UGIB. INR was 4.0 , he is s/p reversal w/ K centra. CT A/P showed possible acute interstitial pancreatitis and concern for multifocal pneumonia. MICU was consulted in AM for hypotension to 89/65, hypothermia, likely UGIB s/p 3UPRBC ,500 cc NS bolus. He was started on levophed and also started on IV ABx w/ Zosyn for possible septic shock.  Pt was transferred to MICU on 4/4.     In MICU pt received 1 additional unit of PRBS on 4/5 w/ subsequent stabilization of Hb. Pt was seen by GI, no need for EGD as no signs of bleeding and H/H remained stable. Sepsis workup including Urine Cx and Blood Cx were negative. Pt was started on midodrine and successfully weaned off of levophed since midnight 4/8.       ASSESSMENT & PLAN:      83M with PMHx of CAD s/p CABG, mixed systolic and diastolic HF (EF 35-40% 2/19), Paroxysmal Afib on rivaroxaban s/p PPM, HTN, HLD, CKD (Cr 1.4-1.7), mod-severe esophagitis, gastric ulcers, left eye blindness BIBEMS from home for confusion w/ hallucinations x 1 day, +epigastric pain and hypothermia. Found to be septic and anemic to 6.5 in ED, episode of melena was reported by ED staff, and he was admitted for suspected UGIB. INR was 4.0 s/p reversal w/ K centra. CT A/P showed possible acute interstitial pancreatitis and concern for multifocal pneumonia. MICU consulted for hypotension 89/65 due to hemorrhagic vs septic shock requiring IV pressors.     Neuro:  #Advanced dementia, A& O x 2, no focal weakness  -fully dependent at home, daughter is taking care of the pt, lives with daughter   - restarted home meds: Donepezil 5 daily and trazodone daily PRN for agitation   -CTH neg 4/4    CV:   #shock due to vasoplegia from sepsis vs GIB vs cardiogenic from HF  - Resolved  -has been off of levophed since midnight   -midodrine 10 started 4/6 was increased to 30 today q 8 yesterday  -TTE -- 4/4- EF 23 %.  Mild MR, Normal left ventricular internal dimensions and wall thicknesses.  Severe global LV dysfx, Right ventricular enlargement with decreased right ventricular systolic function. Moderate pulmonary hypertension.  - 4/5- s/p interrogation PPM-underlying rhythm atrial tachycardia, - intrinsic ventricular rhythm- 50, no ectopy noted on interrogation    #AFib  -currently being Vpaced  -holding A/C rivaroxaban since admission for possible GIB  -started heparin SQ today as H/H stable     Resp:  #possible PNA  -CTAP 4/4 showed "Patchy peribronchovascular airspace opacities at the lung bases concerning for multifocal pneumonia."  -SpO2 100% on RA; no respiratory distress/Sx  -cont empiric Abx w/ Zosyn as below    GI  #anemia on admission with ? melena - R/O Upper GI bleeding   -hx of severe esophagitis and gastric ulcers noted on endoscopy 2014   -reported melena in ER- no signs of bleeding since   -s/p  4 U PRBC, and 1 U plts, 1 U plasma since admission ( last PRBC 4/5)  -on protonix 40 BID I  -seen by GI, no plan for EGD as no signs bleeding and CBC stable  -H/H has been stable 8.2> 7.7>7.9>7.7  ; no further episodes of melena/bleeding noted    #pancreatitis , clinically asymptomatic  - lipase--> >3K >2738> 696 ; downtrended  -CT A/P 4/4: IMPRESSION: Mild diffuse edematous enlargement of the pancreas with peripancreatic inflammatory change and peripancreatic fluid extending along the paracolic gutters and into the pelvis likely reflective of an acute interstitial pancreatitis. Correlate with laboratory exam. Cholelithiasis/gallbladder sludge in an under distended gallbladder. Patchy peribronchovascular airspace opacities at the lung bases concerning for multifocal pneumonia.    #Diet  -pureed with mod thick liquid as per S/S eval- tolerating well     ID  #sepsis/hypothermia in setting of possible PNA and pancreatitis   -CTAP -- Mild diffuse edematous enlargement of the pancreas with peripancreatic inflammatory change and peripancreatic fluid extending along the paracolic gutters and into the pelvis likely reflective of an acute interstitial pancreatitis. Patchy peribronchovascular airspace opacities at the lung bases concerning for multifocal pneumonia.  -on presentation was hypothermic to 94 now 96-98; t WBC trending down overall 18-> 12->14->12  -cortisol level 26.5; off levophed and remains on midodrine [id number]  - blood cxs -- 4/4 -- negative  - U/cx- negative, urine legionella neg  - MRSA- neg  -s/p vanco 4/3- 4/4  -on zosyn ( 4/3- 4/10) 7 day course for possible asp PNA      Renal  #FAY on CKD likely due to ATN   - Creat- 2.5>3.4>3.11> 2.61>2.31;  improving   -s/p Lasix 20 mg IVP and additional 40 mg IVP given 4/5  -s/p Bumex 2 mg followed by Zaroxolyn 5 mg PO - 4/5  -renal following- not a good HD candidate  -701 in 24hrs/ -2.7 L for LOS  -now autodiuresing;  will hold off on further diuretics     Hematology  #? UGIB given low H/H and reported melena in ER- no reported GIB since; of note on rivaroxaban at home for Afib   -Hb was 9.4 december 2021, then 6.4 on this admission  -s/p 4 U PRBC's, 1 U plts, 1 U plasma since admission (last PRB 4/5)   -H/H now stable in the 7s; ( 8.2> 7.7>7.9>7.7)  -continue monitoring H/H q 12 hrs  -holding A/C in setting of possible GIB  -patient unable to tolerate SCD in setting of leg wounds  -DVT PPX: restarted heparin SQ 5000 Q8hrs today as H/H stable     Extremities  #Bilateral lower legs with multiple small scattered venous ulcers exposing pink-moist dermis without cellulitis  -recs as per wound care     #L arm swelling/ hematoma  -Duplex ordered r/o RVT     Endo:   -previously hypoglycemic when pt was NPO- now resolved  w/  FS in low 100s  -on puree with mild thick liquids diet    GOC: DNR/DNI  - discussed with daughter Kiara and updated her on the pt's status, all questions answered   - pt lives with daughter and completely dependent on her care     FOR FOLLOW UP:  [ ] monitor H/H Q12hrs  [ ] LUE Duplex  [ ] monitor BP MICU Transfer Note    Transfer from: MICU    Transfer to: ( x ) Medicine    (  ) Telemetry     (   ) RCU        (    ) Palliative         (   ) Stroke Unit          (   ) __________________    Accepting Physician: Pool Rodriguez MD  Signout given to: Pool Rodriguez MD     MICU COURSE:    83M with PMHx of CAD s/p CABG, mixed systolic and diastolic HF (EF 35-40% 2/19), Paroxysmal Afib on rivaroxaban s/p PPM, HTN, HLD, CKD (Cr 1.4-1.7), mod-severe esophagitis, gastric ulcers (on endoscopy 2014), left eye blindness BIBEMS from home for confusion w/ hallucinations x 1 day, +epigastric pain and hypothermia. Found to be septic and anemic to 6.5 in ED, episode of melena was reported by ED staff and hewas admitted due to concern for UGIB. INR was 4.0 , he is s/p reversal w/ K centra. CT A/P showed possible acute interstitial pancreatitis and concern for multifocal pneumonia. MICU was consulted in AM for hypotension to 89/65, hypothermia, likely UGIB s/p 3UPRBC ,500 cc NS bolus. He was started on levophed and also started on IV ABx w/ Zosyn for possible septic shock.  Pt was transferred to MICU on 4/4.     In MICU pt received 1 additional unit of PRBS on 4/5 w/ subsequent stabilization of Hb. Pt was seen by GI, no need for EGD as no signs of bleeding and H/H remained stable. Sepsis workup including Urine Cx and Blood Cx were negative. Pt was started on midodrine and successfully weaned off of levophed since midnight 4/8.       ASSESSMENT & PLAN:      83M with PMHx of CAD s/p CABG, mixed systolic and diastolic HF (EF 35-40% 2/19), Paroxysmal Afib on rivaroxaban s/p PPM, HTN, HLD, CKD (Cr 1.4-1.7), mod-severe esophagitis, gastric ulcers, left eye blindness BIBEMS from home for confusion w/ hallucinations x 1 day, +epigastric pain and hypothermia. Found to be septic and anemic to 6.5 in ED, episode of melena was reported by ED staff, and he was admitted for suspected UGIB. INR was 4.0 s/p reversal w/ K centra. CT A/P showed possible acute interstitial pancreatitis and concern for multifocal pneumonia. MICU consulted for hypotension 89/65 due to hemorrhagic vs septic shock requiring IV pressors.     Neuro:  #Advanced dementia, A& O x 2, no focal weakness  -fully dependent at home, daughter is taking care of the pt, lives with daughter   - restarted home meds: Donepezil 5 daily and trazodone daily PRN for agitation   -CTH neg 4/4    CV:   #shock due to vasoplegia from sepsis vs GIB vs cardiogenic from HF  - Resolved  -has been off of levophed since midnight   -midodrine 10 started 4/6 was increased to 30 today q 8 yesterday  -TTE -- 4/4- EF 23 %.  Mild MR, Normal left ventricular internal dimensions and wall thicknesses.  Severe global LV dysfx, Right ventricular enlargement with decreased right ventricular systolic function. Moderate pulmonary hypertension.  - 4/5- s/p interrogation PPM-underlying rhythm atrial tachycardia, - intrinsic ventricular rhythm- 50, no ectopy noted on interrogation    #AFib  -currently being Vpaced  -holding A/C rivaroxaban since admission for possible GIB  -started heparin SQ today as H/H stable     Resp:  #possible PNA  -CTAP 4/4 showed "Patchy peribronchovascular airspace opacities at the lung bases concerning for multifocal pneumonia."  -SpO2 100% on RA; no respiratory distress/Sx  -cont empiric Abx w/ Zosyn as below    GI  #anemia on admission with ? melena - R/O Upper GI bleeding   -hx of severe esophagitis and gastric ulcers noted on endoscopy 2014   -reported melena in ER- no signs of bleeding since   -s/p  4 U PRBC, and 1 U plts, 1 U plasma since admission ( last PRBC 4/5)  -on protonix 40 BID I  -seen by GI, no plan for EGD as no signs bleeding and CBC stable  -H/H has been stable 8.2> 7.7>7.9>7.7  ; no further episodes of melena/bleeding noted    #pancreatitis , clinically asymptomatic  - lipase--> >3K >2738> 696 ; downtrended  -CT A/P 4/4: IMPRESSION: Mild diffuse edematous enlargement of the pancreas with peripancreatic inflammatory change and peripancreatic fluid extending along the paracolic gutters and into the pelvis likely reflective of an acute interstitial pancreatitis. Correlate with laboratory exam. Cholelithiasis/gallbladder sludge in an under distended gallbladder. Patchy peribronchovascular airspace opacities at the lung bases concerning for multifocal pneumonia.    #Diet  -pureed with mod thick liquid as per S/S eval- tolerating well     ID  #sepsis/hypothermia in setting of possible PNA and pancreatitis   -CTAP -- Mild diffuse edematous enlargement of the pancreas with peripancreatic inflammatory change and peripancreatic fluid extending along the paracolic gutters and into the pelvis likely reflective of an acute interstitial pancreatitis. Patchy peribronchovascular airspace opacities at the lung bases concerning for multifocal pneumonia.  -on presentation was hypothermic to 94 now 96-98; t WBC trending down overall 18-> 12->14->12  -cortisol level 26.5; off levophed and remains on midodrine [id number]  - blood cxs -- 4/4 -- negative  - U/cx- negative, urine legionella neg  - MRSA- neg  -s/p vanco 4/3- 4/4  -on zosyn ( 4/3- 4/10) 7 day course for possible asp PNA      Renal  #FAY on CKD likely due to ATN   - Creat- 2.5>3.4>3.11> 2.61>2.31;  improving   -s/p Lasix 20 mg IVP and additional 40 mg IVP given 4/5  -s/p Bumex 2 mg followed by Zaroxolyn 5 mg PO - 4/5  -renal following- not a good HD candidate  -701 in 24hrs/ -2.7 L for LOS  -now autodiuresing;  will hold off on further diuretics     Hematology  #? UGIB given low H/H and reported melena in ER- no reported GIB since; of note on rivaroxaban at home for Afib   -Hb was 9.4 december 2021, then 6.4 on this admission  -s/p 4 U PRBC's, 1 U plts, 1 U plasma since admission (last PRB 4/5)   -H/H now stable in the 7s; ( 8.2> 7.7>7.9>7.7)  -continue monitoring H/H q 12 hrs  -holding A/C in setting of possible GIB  -patient unable to tolerate SCD in setting of leg wounds  -DVT PPX: restarted heparin SQ 5000 Q8hrs today as H/H stable     Extremities  #Bilateral lower legs with multiple small scattered venous ulcers exposing pink-moist dermis without cellulitis  -recs as per wound care     #L arm swelling/ hematoma  -Duplex ordered r/o RVT     Endo:   -previously hypoglycemic when pt was NPO- now resolved  w/  FS in low 100s  -on puree with mild thick liquids diet    GOC: DNR/DNI  - pt lives with daughter and completely dependent on her care     FOR FOLLOW UP:  [ ] monitor H/H Q12hrs  [ ] LUE Duplex  [ ] monitor BP MICU Transfer Note    Transfer from: MICU    Transfer to: ( x ) Medicine    (  ) Telemetry     (   ) RCU        (    ) Palliative         (   ) Stroke Unit          (   ) __________________    Accepting Physician: Pool Rodriguez MD  Signout given to: Pool Rodriguez MD     MICU COURSE:    83M with PMHx of CAD s/p CABG, mixed systolic and diastolic HF (EF 35-40% 2/19), Paroxysmal Afib on rivaroxaban s/p PPM, HTN, HLD, CKD (Cr 1.4-1.7), mod-severe esophagitis, gastric ulcers (on endoscopy 2014), left eye blindness BIBEMS from home for confusion w/ hallucinations x 1 day, +epigastric pain and hypothermia. Found to be septic and anemic to 6.5 in ED, episode of melena was reported by ED staff and hewas admitted due to concern for UGIB. INR was 4.0 , he is s/p reversal w/ K centra. CT A/P showed possible acute interstitial pancreatitis and concern for multifocal pneumonia. MICU was consulted in AM for hypotension to 89/65, hypothermia, likely UGIB s/p 3UPRBC ,500 cc NS bolus. He was started on levophed and also started on IV ABx w/ Zosyn for possible septic shock.  Pt was transferred to MICU on 4/4.     In MICU pt received 1 additional unit of PRBS on 4/5 w/ subsequent stabilization of Hb. Pt was seen by GI, no need for EGD as no signs of bleeding and H/H remained stable. Sepsis workup including Urine Cx and Blood Cx were negative. Pt was started on midodrine and successfully weaned off of levophed since midnight 4/8.       ASSESSMENT & PLAN:      83M with PMHx of CAD s/p CABG, mixed systolic and diastolic HF (EF 35-40% 2/19), Paroxysmal Afib on rivaroxaban s/p PPM, HTN, HLD, CKD (Cr 1.4-1.7), mod-severe esophagitis, gastric ulcers, left eye blindness BIBEMS from home for confusion w/ hallucinations x 1 day, +epigastric pain and hypothermia. Found to be septic and anemic to 6.5 in ED, episode of melena was reported by ED staff, and he was admitted for suspected UGIB. INR was 4.0 s/p reversal w/ K centra. CT A/P showed possible acute interstitial pancreatitis and concern for multifocal pneumonia. MICU consulted for hypotension 89/65 due to hemorrhagic vs septic shock requiring IV pressors.     Neuro:  #Advanced dementia, A& O x 2, no focal weakness  -fully dependent at home, daughter is taking care of the pt, lives with daughter   - restarted home meds: Donepezil 5 daily and trazodone daily PRN for agitation   -CTH neg 4/4    CV:   #shock due to vasoplegia from sepsis vs GIB vs cardiogenic from HF  - Resolved  -has been off of levophed since midnight   -midodrine 10 started 4/6 was increased to 30 today q 8 yesterday  -TTE -- 4/4- EF 23 %.  Mild MR, Normal left ventricular internal dimensions and wall thicknesses.  Severe global LV dysfx, Right ventricular enlargement with decreased right ventricular systolic function. Moderate pulmonary hypertension.  - 4/5- s/p interrogation PPM-underlying rhythm atrial tachycardia, - intrinsic ventricular rhythm- 50, no ectopy noted on interrogation    #AFib  -currently being Vpaced  -holding A/C rivaroxaban since admission for possible GIB  -started heparin SQ today as H/H stable     Resp:  #possible PNA  -CTAP 4/4 showed "Patchy peribronchovascular airspace opacities at the lung bases concerning for multifocal pneumonia."  -SpO2 100% on RA; no respiratory distress/Sx  -cont empiric Abx w/ Zosyn as below    GI  #anemia on admission with ? melena - R/O Upper GI bleeding   -hx of severe esophagitis and gastric ulcers noted on endoscopy 2014   -reported melena in ER- no signs of bleeding since   -s/p  4 U PRBC, and 1 U plts, 1 U plasma since admission ( last PRBC 4/5)  -on protonix 40 BID I  -seen by GI, no plan for EGD as no signs bleeding and CBC stable  -H/H has been stable 8.2> 7.7>7.9>7.7  ; no further episodes of melena/bleeding noted    #pancreatitis , clinically asymptomatic  - lipase--> >3K >2738> 696 ; downtrended  -CT A/P 4/4: IMPRESSION: Mild diffuse edematous enlargement of the pancreas with peripancreatic inflammatory change and peripancreatic fluid extending along the paracolic gutters and into the pelvis likely reflective of an acute interstitial pancreatitis. Correlate with laboratory exam. Cholelithiasis/gallbladder sludge in an under distended gallbladder. Patchy peribronchovascular airspace opacities at the lung bases concerning for multifocal pneumonia.    #Diet  -pureed with mod thick liquid as per S/S eval- tolerating well     ID  #sepsis/hypothermia in setting of possible PNA and pancreatitis   -CTAP -- Mild diffuse edematous enlargement of the pancreas with peripancreatic inflammatory change and peripancreatic fluid extending along the paracolic gutters and into the pelvis likely reflective of an acute interstitial pancreatitis. Patchy peribronchovascular airspace opacities at the lung bases concerning for multifocal pneumonia.  -on presentation was hypothermic to 94 now 96-98; t WBC trending down overall 18-> 12->14->12  -cortisol level 26.5; off levophed and remains on midodrine 76F1pmh  - blood cxs -- 4/4 -- negative  - U/cx- negative, urine legionella neg  - MRSA- neg  -s/p vanco 4/3- 4/4  -on zosyn ( 4/3- 4/10) 7 day course for possible asp PNA      Renal  #FAY on CKD likely due to ATN   - Creat- 2.5>3.4>3.11> 2.61>2.31;  improving   -s/p Lasix 20 mg IVP and additional 40 mg IVP given 4/5  -s/p Bumex 2 mg followed by Zaroxolyn 5 mg PO - 4/5  -renal following- not a good HD candidate  -701 in 24hrs/ -2.7 L for LOS  -now autodiuresing;  will hold off on further diuretics     Hematology  #? UGIB given low H/H and reported melena in ER- no reported GIB since; of note on rivaroxaban at home for Afib   -Hb was 9.4 december 2021, then 6.4 on this admission  -s/p 4 U PRBC's, 1 U plts, 1 U plasma since admission (last PRB 4/5)   -H/H now stable in the 7s; ( 8.2> 7.7>7.9>7.7)  -continue monitoring H/H q 12 hrs  -holding A/C in setting of possible GIB  -patient unable to tolerate SCD in setting of leg wounds  -DVT PPX: restarted heparin SQ 5000 Q8hrs today as H/H stable     Extremities  #Bilateral lower legs with multiple small scattered venous ulcers exposing pink-moist dermis without cellulitis  -recs as per wound care     #L arm swelling/ hematoma  -Duplex ordered r/o RVT     Endo:   -previously hypoglycemic when pt was NPO- now resolved  w/  FS in low 100s  -on puree with mild thick liquids diet    GOC: DNR/DNI  - pt lives with daughter and completely dependent on her care     FOR FOLLOW UP:  [ ] monitor H/H   [ ] LUE Duplex  [ ] monitor BP Satisfactory

## 2022-06-23 NOTE — ASU PATIENT PROFILE, ADULT - FALL HARM RISK - UNIVERSAL INTERVENTIONS
Bed in lowest position, wheels locked, appropriate side rails in place/Call bell, personal items and telephone in reach/Instruct patient to call for assistance before getting out of bed or chair/Non-slip footwear when patient is out of bed/[place] to call system/Physically safe environment - no spills, clutter or unnecessary equipment/Purposeful Proactive Rounding/Room/bathroom lighting operational, light cord in reach

## 2022-06-23 NOTE — PRE-ANESTHESIA EVALUATION ADULT - NSANTHPEFT_GEN_ALL_CORE
NAD; Alert & oriented x 4; No focal deficits, motor function, sensorium intact. Airway patent, respirations non-labored, breath sounds equal & clear to auscultation bilaterally; S1/S2, regular rate and rhythm.

## 2022-06-23 NOTE — ASU PATIENT PROFILE, ADULT - NSICDXPASTMEDICALHX_GEN_ALL_CORE_FT
PAST MEDICAL HISTORY:  Gross hematuria     H/O: CVA (cerebrovascular accident)     HLD (hyperlipidemia)     HTN (hypertension)

## 2022-06-23 NOTE — PRE-ANESTHESIA EVALUATION ADULT - NSPROPOSEDPROCEDFT_GEN_ALL_CORE
Transurethral Resection Of Bladder Tumor, Cystourethroscopy W/ Ureteroscopy and Pyleoscopy, Insertion of Indwelling  Stent

## 2022-06-23 NOTE — ASU DISCHARGE PLAN (ADULT/PEDIATRIC) - ASU DC SPECIAL INSTRUCTIONSFT
home with sumaya mora, remove in office monday    aspirin to be restarted tomorrow, plavix on monday     tylenol / motrin for pain . azo sent to pharmacy     take cipro as prescribed

## 2022-06-23 NOTE — ASU DISCHARGE PLAN (ADULT/PEDIATRIC) - NS MD DC FALL RISK RISK
For information on Fall & Injury Prevention, visit: [email or web address] OR  [email or web address] OR  https://www.cdc.gov/steadi/patient.html

## 2022-07-01 LAB — SURGICAL PATHOLOGY STUDY: SIGNIFICANT CHANGE UP

## 2023-05-11 NOTE — PRE-ANESTHESIA EVALUATION ADULT - NSPREOPDXFT_GEN_ALL_CORE
left thigh wound Suturegard Body: The suture ends were repeatedly re-tightened and re-clamped to achieve the desired tissue expansion.

## 2024-09-21 NOTE — H&P PST ADULT - NSSUBSTANCEUSE_GEN_ALL_CORE_SD
Discharge Diagnosis  Acute hypoxic respiratory failure (Multi)  Asthma exacerbation  CHF exacerbation   Pleural effusion    Issues Requiring Follow-Up  - Follow up with PCP for post hospital discharge  - Follow up with Pulmonary for Asthma management  - Follow up with Cardiology for CHF management  - START take Steroid-tapered course and Maintenance Inhaler (Symbicort) for Asthma:  Prednisone 40mg (4 pills of 10mg) x 3 days starting 9/22/24, followed by  Prednisone 30mg (3pills of 10mg) x 3 days, followed by  Prednisone 20mg (2 pills of 10mg) x 3 days, followed by  Prednisone 10mg (1 pill of 10mg) x 3 days, followed by  Prednisone 5mg (1/2 pill of 10mg) x 3days, then  STOP.  - CONTINUE taking oral Bumex 2mg in the am and 1mg in pm.  If you gain more than 2 lbs then take 2 mg in the evening. Call Cardiology office for a weight increase of more than 5 lbs.     Discharge Meds     Medication List      START taking these medications     budesonide-formoteroL 80-4.5 mcg/actuation inhaler; Commonly known as:   Symbicort; Inhale 2 puffs 2 times a day. Rinse mouth with water after use   to reduce aftertaste and incidence of candidiasis. Do not swallow.   predniSONE 10 mg tablet; Commonly known as: Deltasone; Take 1 tablet (10   mg) by mouth once daily.     CHANGE how you take these medications     clobetasol 0.05 % cream; Commonly known as: Temovate; Apply topically 2   times a day.; What changed: how much to take, when to take this, reasons   to take this     CONTINUE taking these medications     acetaminophen 325 mg tablet; Commonly known as: Tylenol   albuterol 2.5 mg /3 mL (0.083 %) nebulizer solution; Take 3 mL (2.5 mg)   by nebulization 3 times a day.   baclofen 10 mg tablet; Commonly known as: Lioresal; One tablet in the   morning and 2 tablets in the evening.   * bumetanide 2 mg tablet; Commonly known as: Bumex   * bumetanide 1 mg tablet; Commonly known as: Bumex; Take 1 tablet (1 mg)   by mouth once daily in the  "evening. Take with meals.   Cardizem  mg 24 hr tablet; Generic drug: dilTIAZem LA   clopidogrel 75 mg tablet; Commonly known as: Plavix; Take 1 tablet (75   mg) by mouth once daily for 365 doses.   cyanocobalamin 1,000 mcg/mL injection; Commonly known as: Vitamin B-12;   Inject 1 mL (1,000 mcg) into the muscle every 30 (thirty) days.   diclofenac sodium 1 % gel; Commonly known as: Voltaren; Apply 4.5 inches   (4 g) topically 2 times a day as needed (joint pain).   digoxin 125 MCG tablet; Commonly known as: Lanoxin   EPINEPHrine 0.3 mg/0.3 mL injection syringe; Commonly known as: Epipen;   Inject 0.3 mL (0.3 mg) into the muscle 1 time if needed for anaphylaxis.   famotidine 40 mg tablet; Commonly known as: Pepcid; Take 1 tablet (40   mg) by mouth once daily.   hydroxychloroquine 200 mg tablet; Commonly known as: Plaquenil; Take 1.5   tablets (300 mg) by mouth once daily.   insulin syringe-needle U-100 1 mL 30 gauge x 1/2\" syringe; Commonly   known as: BD Insulin Syringe Ultra-Fine; Use one a month   Miralax 17 gram/dose powder; Generic drug: polyethylene glycol   montelukast 10 mg tablet; Commonly known as: Singulair; Take 1 tablet   (10 mg) by mouth once daily at bedtime.   naloxone 4 mg/0.1 mL nasal spray; Commonly known as: Narcan; Administer   1 spray (4 mg) into affected nostril(s) if needed for opioid reversal or   respiratory depression. May repeat every 2-3 minutes if needed,   alternating nostrils, until medical assistance becomes available.   nitroglycerin 0.4 mg SL tablet; Commonly known as: Nitrostat   ondansetron ODT 8 mg disintegrating tablet; Commonly known as:   Zofran-ODT; Take 1 tablet (8 mg) by mouth once daily as needed for nausea   or vomiting.   * oxyCODONE-acetaminophen 5-325 mg tablet; Commonly known as: Percocet;   Take 1 tablet by mouth 3 times a day as needed for severe pain (7 - 10)   for up to 28 days. Do not fill before August 3, 2024.   * oxyCODONE-acetaminophen 5-325 mg tablet; " Commonly known as: Percocet;   Take 1 tablet by mouth 3 times a day as needed for severe pain (7 - 10)   for up to 28 days. Do not fill before August 31, 2024.   * oxyCODONE-acetaminophen 5-325 mg tablet; Commonly known as: Percocet;   Take 1 tablet by mouth 3 times a day as needed for severe pain (7 - 10)   for up to 28 days. Do not fill before September 28, 2024.; Start taking   on: September 28, 2024   pantoprazole 40 mg EC tablet; Commonly known as: ProtoNix; TAKE ONE   TABLET BY MOUTH TWO TIMES A DAY   rosuvastatin 20 mg tablet; Commonly known as: Crestor; Take 1 tablet (20   mg) by mouth once daily.   saccharomyces boulardii 250 mg capsule; Commonly known as: Florastor   sucralfate 100 mg/mL suspension; Commonly known as: Carafate; Take 10 mL   (1 g) by mouth 4 times a day with meals.   sulfaSALAzine 500 mg tablet; Commonly known as: Azulfidine; Take 1   tablet (500 mg) by mouth 2 times a day.   Vitamin D3 25 MCG (1000 UT) tablet; Generic drug: cholecalciferol  * This list has 5 medication(s) that are the same as other medications   prescribed for you. Read the directions carefully, and ask your doctor or   other care provider to review them with you.       Test Results Pending At Discharge  Pending Labs       No current pending labs.            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 She follows with Dr. Soto for her Cardiac Hx but has not seen them since last discharge on August 10.  Patient discharged on Bumex 2mg qAM and 1mg qPM which reportedly was decrease in amount that was normal for her.  Patient also is in the process of trying to find a new pulmonologist, she previously saw Dr. Marin but has  not followed up since 2021.      She denies any fever, chills, sweats, chest pain, palpitations, vomiting, diarrhea, melena, hematochezia, urinary symptoms including frequency urgency dysuria or worsening leg swelling.  She does mention that she have nausea which is chronic for her which she managed with Zofran as needed.  She denies any recent illness or upper respiratory symptoms.  She does not smoke, drink, or use drugs. Denies sick contacts.      12 point Review of Systems negative unless stated above.      ED course:  -  HDS on 2 L NC, , creatinine 1.09, lactate 1.1, WBC 8.2, magnesium 2.11, , Trop 34, flu COVID-negative, digoxin level 0.9, D-dimer 735, VBG: pH 7.44, pCO2 40  - CXR: Mild congestion, CT angio no sign of PE  - Interventions: Bumex 0.5, Solu-Medrol    Hospital Course  For pt's acute hypoxic respiratory failure, pt was placed on supplemental oxygenation.     For acute decompendated heart failure, cardiology was placed on consult and pt was treated with diuretics. Daily weights, Strict I&Os, and fluid restriction were followed. Pt was placed on a 2 gm sodium diet.     For Pt's R pleural effusion, pulmonology was placed on consult. Pt was treated with diuresis.  Pleural effusion showed improvement with diuresis.    For pt's acute COPD exacerbation, pulmonology recommended treating pt with solu-medrol 60 mg BID, added Breo Ellipta, Duonebs q 2-4 hrs. Pt was started with BiPAP at night and as needed during day. Oxygen was weaned as tolerated.   Patient had walking pulse ox with no desaturation, lowest %sat 93.   Patient remained hemodynamically stable and will be discharged on following medications and recommendations:  - Follow up with PCP for post hospital discharge  - Follow up with Pulmonary for Asthma management  - Follow up with Cardiology for CHF management  - START take Steroid-tapered course and Maintenance Inhaler (Symbicort) for Asthma:  Prednisone 40mg (4 pills of 10mg) x 3  days starting 9/22/24, followed by  Prednisone 30mg (3pills of 10mg) x 3 days, followed by  Prednisone 20mg (2 pills of 10mg) x 3 days, followed by  Prednisone 10mg (1 pill of 10mg) x 3 days, followed by  Prednisone 5mg (1/2 pill of 10mg) x 3days, then  STOP.  - CONTINUE taking oral Bumex 2mg in the am and 1mg in pm.  If you gain more than 2 lbs then take 2 mg in the evening. Call Cardiology office for a weight increase of more than 5 lbs.       Pertinent Physical Exam At Time of Discharge  Physical Exam  Constitutional: Normal appearance.  HEENT: Normocephalic and atraumatic.  Cardiovascular: Normal rate and regular rhythm.  Pulmonary: Loud expiratory wheeze, tachypneic, coarse breath sounds bilaterally..  Musculoskeletal: No edema, no cyanosis.  Neurological: Awake, alert and oriented x3.  Psychiatric: Normal behavior, mood and affect.    Outpatient Follow-Up  Future Appointments   Date Time Provider Department Center   9/24/2024  8:00 AM INF 06 GEAUGA SCCGEAINF [facility]   9/27/2024  8:00 AM INF 06 GE[facility]GA [facility]GEAINF [facility]   9/30/2024  8:00 AM INF 11 [facility]GA [facility]   10/3/2024  8:00 AM INF 06 GE[facility]GA [facility]   10/8/2024  8:20 AM Carlos Harley MD [id number] [facility]   10/21/2024  9:15 AM Latoya Barkley APRN-CNP [email or web address] [facility]   11/12/2024  8:40 AM Carlos Harley MD [id number] [facility]   11/15/2024 10:00 AM AHU CT 1 AHUCT [facility]   3/3/2025  9:00 AM Fredy Elias DO [id number] [facility]         Domenica Manning MD   never used

## 2024-12-18 NOTE — PHYSICAL THERAPY INITIAL EVALUATION ADULT - DIAGNOSIS, PT EVAL
Our office faxed the referral and records to the referring physician's office and received a fax confirmation back.   
Gait Disorder
Difficulty ambulating

## 2025-04-15 NOTE — PHYSICAL THERAPY INITIAL EVALUATION ADULT - PLANNED THERAPY INTERVENTIONS, PT EVAL
Quality 226: Preventive Care And Screening: Tobacco Use: Screening And Cessation Intervention: Patient screened for tobacco use and is an ex/non-smoker Detail Level: Detailed Quality 130: Documentation Of Current Medications In The Medical Record: Current Medications Documented Quality 431: Preventive Care And Screening: Unhealthy Alcohol Use - Screening: Patient not identified as an unhealthy alcohol user when screened for unhealthy alcohol use using a systematic screening method balance training/bed mobility training/gait training/strengthening/transfer training
